# Patient Record
Sex: FEMALE | URBAN - NONMETROPOLITAN AREA
[De-identification: names, ages, dates, MRNs, and addresses within clinical notes are randomized per-mention and may not be internally consistent; named-entity substitution may affect disease eponyms.]

---

## 2021-12-18 ENCOUNTER — HOSPITAL ENCOUNTER (EMERGENCY)
Facility: HOSPITAL | Age: 26
Discharge: LEFT BEFORE DISCHARGE | End: 2021-12-18

## 2021-12-18 VITALS
OXYGEN SATURATION: 100 % | SYSTOLIC BLOOD PRESSURE: 140 MMHG | DIASTOLIC BLOOD PRESSURE: 102 MMHG | HEART RATE: 84 BPM | WEIGHT: 155 LBS | RESPIRATION RATE: 18 BRPM | BODY MASS INDEX: 27.46 KG/M2 | HEIGHT: 63 IN | TEMPERATURE: 98.6 F

## 2021-12-18 PROCEDURE — 99211 OFF/OP EST MAY X REQ PHY/QHP: CPT

## 2024-03-25 DIAGNOSIS — O99.340 ANXIETY DURING PREGNANCY: Primary | ICD-10-CM

## 2024-03-25 DIAGNOSIS — F41.9 ANXIETY DURING PREGNANCY: Primary | ICD-10-CM

## 2024-03-25 RX ORDER — BUSPIRONE HYDROCHLORIDE 5 MG/1
5 TABLET ORAL 2 TIMES DAILY PRN
Qty: 60 TABLET | Refills: 5 | Status: SHIPPED | OUTPATIENT
Start: 2024-03-25

## 2024-04-17 ENCOUNTER — INITIAL PRENATAL (OUTPATIENT)
Dept: OBSTETRICS AND GYNECOLOGY | Facility: CLINIC | Age: 29
End: 2024-04-17
Payer: COMMERCIAL

## 2024-04-17 VITALS — SYSTOLIC BLOOD PRESSURE: 132 MMHG | BODY MASS INDEX: 27.28 KG/M2 | DIASTOLIC BLOOD PRESSURE: 82 MMHG | WEIGHT: 154 LBS

## 2024-04-17 DIAGNOSIS — Z98.891 PREVIOUS CESAREAN SECTION: ICD-10-CM

## 2024-04-17 DIAGNOSIS — Z34.91 PRENATAL CARE IN FIRST TRIMESTER: Primary | ICD-10-CM

## 2024-04-17 DIAGNOSIS — F41.9 ANXIETY: ICD-10-CM

## 2024-04-17 DIAGNOSIS — O09.899 SHORT INTERVAL BETWEEN PREGNANCIES AFFECTING PREGNANCY, ANTEPARTUM: ICD-10-CM

## 2024-04-17 DIAGNOSIS — O36.80X0 ENCOUNTER TO DETERMINE FETAL VIABILITY OF PREGNANCY, SINGLE OR UNSPECIFIED FETUS: ICD-10-CM

## 2024-04-17 PROCEDURE — 0501F PRENATAL FLOW SHEET: CPT | Performed by: OBSTETRICS & GYNECOLOGY

## 2024-04-19 LAB
AMPHETAMINES UR QL SCN: NEGATIVE NG/ML
BARBITURATES UR QL SCN: NEGATIVE NG/ML
BENZODIAZ UR QL SCN: NEGATIVE NG/ML
BZE UR QL SCN: NEGATIVE NG/ML
CANNABINOIDS UR QL SCN: NEGATIVE NG/ML
CREAT UR-MCNC: 10.8 MG/DL (ref 20–300)
LABORATORY COMMENT REPORT: ABNORMAL
METHADONE UR QL SCN: NEGATIVE NG/ML
OPIATES UR QL SCN: NEGATIVE NG/ML
OXYCODONE+OXYMORPHONE UR QL SCN: NEGATIVE NG/ML
PCP UR QL: NEGATIVE NG/ML
PH UR: 6.5 [PH] (ref 4.5–8.9)
PROPOXYPH UR QL SCN: NEGATIVE NG/ML
SP GR UR: 1

## 2024-04-21 LAB
ABO GROUP BLD: NORMAL
ALBUMIN SERPL-MCNC: 4.7 G/DL (ref 4–5)
ALBUMIN/GLOB SERPL: 1.7 {RATIO} (ref 1.2–2.2)
ALP SERPL-CCNC: 60 IU/L (ref 44–121)
ALT SERPL-CCNC: 12 IU/L (ref 0–32)
AST SERPL-CCNC: 16 IU/L (ref 0–40)
BASOPHILS # BLD AUTO: 0 X10E3/UL (ref 0–0.2)
BASOPHILS NFR BLD AUTO: 0 %
BILIRUB SERPL-MCNC: 0.3 MG/DL (ref 0–1.2)
BLD GP AB SCN SERPL QL: NEGATIVE
BUN SERPL-MCNC: 11 MG/DL (ref 6–20)
BUN/CREAT SERPL: 13 (ref 9–23)
C TRACH RRNA SPEC QL NAA+PROBE: NEGATIVE
CALCIUM SERPL-MCNC: 9.6 MG/DL (ref 8.7–10.2)
CHLORIDE SERPL-SCNC: 102 MMOL/L (ref 96–106)
CO2 SERPL-SCNC: 21 MMOL/L (ref 20–29)
CREAT SERPL-MCNC: 0.83 MG/DL (ref 0.57–1)
CREAT UR-MCNC: 10.2 MG/DL
EGFRCR SERPLBLD CKD-EPI 2021: 100 ML/MIN/1.73
EOSINOPHIL # BLD AUTO: 0.1 X10E3/UL (ref 0–0.4)
EOSINOPHIL NFR BLD AUTO: 1 %
ERYTHROCYTE [DISTWIDTH] IN BLOOD BY AUTOMATED COUNT: 12.1 % (ref 11.7–15.4)
GLOBULIN SER CALC-MCNC: 2.7 G/DL (ref 1.5–4.5)
GLUCOSE SERPL-MCNC: 95 MG/DL (ref 70–99)
HBV SURFACE AG SERPL QL IA: NEGATIVE
HCT VFR BLD AUTO: 44.5 % (ref 34–46.6)
HCV IGG SERPL QL IA: NON REACTIVE
HGB BLD-MCNC: 14.9 G/DL (ref 11.1–15.9)
HIV 1+2 AB+HIV1 P24 AG SERPL QL IA: NON REACTIVE
IMM GRANULOCYTES # BLD AUTO: 0 X10E3/UL (ref 0–0.1)
IMM GRANULOCYTES NFR BLD AUTO: 0 %
LYMPHOCYTES # BLD AUTO: 1.9 X10E3/UL (ref 0.7–3.1)
LYMPHOCYTES NFR BLD AUTO: 20 %
MCH RBC QN AUTO: 28.7 PG (ref 26.6–33)
MCHC RBC AUTO-ENTMCNC: 33.5 G/DL (ref 31.5–35.7)
MCV RBC AUTO: 86 FL (ref 79–97)
MONOCYTES # BLD AUTO: 0.6 X10E3/UL (ref 0.1–0.9)
MONOCYTES NFR BLD AUTO: 6 %
N GONORRHOEA RRNA SPEC QL NAA+PROBE: NEGATIVE
NEUTROPHILS # BLD AUTO: 6.9 X10E3/UL (ref 1.4–7)
NEUTROPHILS NFR BLD AUTO: 73 %
PLATELET # BLD AUTO: 248 X10E3/UL (ref 150–450)
POTASSIUM SERPL-SCNC: 3.9 MMOL/L (ref 3.5–5.2)
PROT SERPL-MCNC: 7.4 G/DL (ref 6–8.5)
PROT UR-MCNC: <4 MG/DL
PROT/CREAT UR: ABNORMAL MG/G CREAT (ref 0–200)
RBC # BLD AUTO: 5.2 X10E6/UL (ref 3.77–5.28)
RH BLD: NEGATIVE
RPR SER QL: NON REACTIVE
RUBV IGG SERPL IA-ACNC: 2.7 INDEX
SODIUM SERPL-SCNC: 139 MMOL/L (ref 134–144)
WBC # BLD AUTO: 9.5 X10E3/UL (ref 3.4–10.8)

## 2024-04-29 PROBLEM — Z98.891 PREVIOUS CESAREAN SECTION: Status: ACTIVE | Noted: 2024-04-29

## 2024-04-29 PROBLEM — O09.899 SHORT INTERVAL BETWEEN PREGNANCIES AFFECTING PREGNANCY, ANTEPARTUM: Status: ACTIVE | Noted: 2024-04-29

## 2024-04-29 PROBLEM — F41.9 ANXIETY: Status: ACTIVE | Noted: 2024-04-29

## 2024-04-29 NOTE — PROGRESS NOTES
New Pregnancy Visit    Subjective   Chief Complaint   Patient presents with    Initial Prenatal Visit     LMP 24, TVS done today 8w4d. No complaints       Sandhya Marte is a 28 y.o. year old .  Patient's last menstrual period was 2024 (exact date).  She presents to initiate prenatal care with our group today.     Previous  in 2023 secondary to fetal heart rate.  She was induced for elevated blood pressure at 37 weeks.  No current blood pressure medication.  History of anxiety, stable on BuSpar 5 mg twice daily as needed.    Social History    Tobacco Use      Smoking status: Never      Smokeless tobacco: Never      Current Outpatient Medications on File Prior to Visit   Medication Sig Dispense Refill    busPIRone (BUSPAR) 5 MG tablet Take 1 tablet by mouth 2 (Two) Times a Day As Needed (anxiety). 60 tablet 5     No current facility-administered medications on file prior to visit.          Objective   /82   Wt 69.9 kg (154 lb)   LMP 2024 (Exact Date)   BMI 27.28 kg/m²   Physical Exam:  Normal, gestational age-appropriate exam today        Medical Decision Making:    Lab Review:   No data reviewed    Note Review:  None    Imaging Review:  Pelvic ultrasound report  IUP measuring 8+4 weeks with appropriate fetal cardiac activity. YASMIN is set at 2024 (sure LMP). There is a 1.8 cm subchorionic hematoma noted. The cervix and bilateral ovaries are normal in appearance. The right ovary does contain a 3.7 cm simple cyst. No free fluid in the cul-de-sac.   Assessment   IUP at 9+1 weeks  Supervision of high risk pregnancy  Previous C/S with planned repeat C/S  Short interval pregnancy  Subchorionic hematoma  History of gestational hypertension  History of anxiety, stable on medication     Plan    The problem list for pregnancy was initiated today  Tests/Orders/Rx for today:  Orders Placed This Encounter   Procedures    Chlamydia trachomatis, Neisseria gonorrhoeae, PCR w/  confirmation - Urine, Urine, Clean Catch     Order Specific Question:   Release to patient     Answer:   Routine Release [[id number]]    US Ob Transvaginal     Order Specific Question:   Reason for Exam:     Answer:   NOB, dates, viability     Order Specific Question:   Release to patient     Answer:   Routine Release [[id number]]    Urine Drug Screen - Urine, Clean Catch     Order Specific Question:   Release to patient     Answer:   Routine Release [[id number]]    OB Panel With HIV     Order Specific Question:   Release to patient     Answer:   Routine Release [[id number]]    Comprehensive Metabolic Panel     Order Specific Question:   Release to patient     Answer:   Routine Release [[id number]]    Protein / Creatinine Ratio, Urine - Urine, Clean Catch     Order Specific Question:   Release to patient     Answer:   Routine Release [[id number]]    Specific Gravity       Medication Management: None    Testing for GC / Chlamydia / trichomonas was done today  Genetic testing reviewed: she will consider the information and make a decision at a later date.  Information reviewed: exercise in pregnancy, nutrition in pregnancy, weight gain in pregnancy, work and travel restrictions during pregnancy, list of OTC medications acceptable in pregnancy, and call coverage groups    Follow up: 4 week(s)    Liu Paniagua MD  Obstetrics and Gynecology  [facility]

## 2024-05-16 ENCOUNTER — ROUTINE PRENATAL (OUTPATIENT)
Dept: OBSTETRICS AND GYNECOLOGY | Facility: CLINIC | Age: 29
End: 2024-05-16
Payer: COMMERCIAL

## 2024-05-16 VITALS — DIASTOLIC BLOOD PRESSURE: 62 MMHG | WEIGHT: 153 LBS | BODY MASS INDEX: 27.1 KG/M2 | SYSTOLIC BLOOD PRESSURE: 110 MMHG

## 2024-05-16 DIAGNOSIS — Z34.92 PRENATAL CARE IN SECOND TRIMESTER: Primary | ICD-10-CM

## 2024-05-16 DIAGNOSIS — O09.899 SHORT INTERVAL BETWEEN PREGNANCIES AFFECTING PREGNANCY, ANTEPARTUM: ICD-10-CM

## 2024-05-16 DIAGNOSIS — Z98.891 PREVIOUS CESAREAN SECTION: ICD-10-CM

## 2024-05-21 NOTE — PROGRESS NOTES
Prenatal Care Visit    Subjective   Chief Complaint   Patient presents with    Routine Prenatal Visit       History:   Sandhya is a  currently at 13w2d who presents for a prenatal care visit today.    No major issues.    Social History    Tobacco Use      Smoking status: Never      Smokeless tobacco: Never       Objective   /62   Wt 69.4 kg (153 lb)   LMP 2024 (Exact Date)   BMI 27.10 kg/m²   Physical Exam:  Normal, gestational age-appropriate exam today        Plan   Medical Decision Making:    I have reviewed the prenatal labs and ultrasound(s) today. I have reviewed the most recent prenatal progress note(s).    Diagnosis: Supervision of high risk pregnancy  Previous C/S with planned repeat C/S  Short interval pregnancy  Subchorionic hematoma  History of gestational hypertension  History of anxiety, stable on medication   Tests/Orders/Rx today: No orders of the defined types were placed in this encounter.      Medication Management: Start aspirin 81 mg daily     Topics discussed: Prenatal care milestones  ASA   Tests next visit: U/S for anatomic screening   Next visit: 5 week(s)     Liu Paniagua MD  Obstetrics and Gynecology  [facility]

## 2024-06-25 ENCOUNTER — ROUTINE PRENATAL (OUTPATIENT)
Dept: OBSTETRICS AND GYNECOLOGY | Facility: CLINIC | Age: 29
End: 2024-06-25
Payer: COMMERCIAL

## 2024-06-25 VITALS — DIASTOLIC BLOOD PRESSURE: 90 MMHG | SYSTOLIC BLOOD PRESSURE: 142 MMHG | BODY MASS INDEX: 27.1 KG/M2 | WEIGHT: 153 LBS

## 2024-06-25 DIAGNOSIS — O99.340 ANXIETY DURING PREGNANCY: ICD-10-CM

## 2024-06-25 DIAGNOSIS — R10.2 PELVIC PRESSURE IN FEMALE: ICD-10-CM

## 2024-06-25 DIAGNOSIS — F41.9 ANXIETY DURING PREGNANCY: ICD-10-CM

## 2024-06-25 DIAGNOSIS — Z34.92 PRENATAL CARE IN SECOND TRIMESTER: Primary | ICD-10-CM

## 2024-06-25 DIAGNOSIS — Z36.89 ENCOUNTER FOR FETAL ANATOMIC SURVEY: ICD-10-CM

## 2024-06-25 PROCEDURE — 0502F SUBSEQUENT PRENATAL CARE: CPT | Performed by: OBSTETRICS & GYNECOLOGY

## 2024-06-25 RX ORDER — BUSPIRONE HYDROCHLORIDE 5 MG/1
10 TABLET ORAL 2 TIMES DAILY PRN
Qty: 60 TABLET | Refills: 5 | Status: SHIPPED | OUTPATIENT
Start: 2024-06-25

## 2024-06-26 LAB
APPEARANCE UR: CLEAR
BACTERIA #/AREA URNS HPF: NORMAL /[HPF]
BILIRUB UR QL STRIP: NEGATIVE
CASTS URNS QL MICRO: NORMAL /LPF
COLOR UR: YELLOW
EPI CELLS #/AREA URNS HPF: NORMAL /HPF (ref 0–10)
GLUCOSE UR QL STRIP: NEGATIVE
HGB UR QL STRIP: NEGATIVE
KETONES UR QL STRIP: NEGATIVE
LEUKOCYTE ESTERASE UR QL STRIP: NEGATIVE
MICRO URNS: NORMAL
MICRO URNS: NORMAL
NITRITE UR QL STRIP: NEGATIVE
PH UR STRIP: 6.5 [PH] (ref 5–7.5)
PROT UR QL STRIP: NEGATIVE
RBC #/AREA URNS HPF: NORMAL /HPF (ref 0–2)
SP GR UR STRIP: 1.01 (ref 1–1.03)
URINALYSIS REFLEX: NORMAL
UROBILINOGEN UR STRIP-MCNC: 0.2 MG/DL (ref 0.2–1)
WBC #/AREA URNS HPF: NORMAL /HPF (ref 0–5)

## 2024-07-03 NOTE — PROGRESS NOTES
Prenatal Care Visit    Subjective   Chief Complaint   Patient presents with    Routine Prenatal Visit     Anatomy scan       History:   Sandhya is a  currently at 19w0d who presents for a prenatal care visit today.    No major issues.    Social History    Tobacco Use      Smoking status: Never      Smokeless tobacco: Never       Objective   /90   Wt 69.4 kg (153 lb)   LMP 2024 (Exact Date)   BMI 27.10 kg/m²   Physical Exam:  Normal, gestational age-appropriate exam today        Plan   Medical Decision Making:    I have reviewed the prenatal labs and ultrasound(s) today. I have reviewed the most recent prenatal progress note(s).    Diagnosis: Supervision of high risk pregnancy  Previous C/S with planned repeat C/S  Short interval pregnancy  Subchorionic hematoma  History of gestational hypertension  History of anxiety, stable on medication   Tests/Orders/Rx today: Orders Placed This Encounter   Procedures    US Ob 14 + Weeks Single or First Gestation     Order Specific Question:   Reason for Exam:     Answer:   anatomy scan     Order Specific Question:   Release to patient     Answer:   Routine Release [[id number]]    Urinalysis With Culture If Indicated - Urine, Clean Catch     Order Specific Question:   Release to patient     Answer:   Routine Release [[id number]]    Microscopic Examination -       Medication Management: Increase BuSpar dosing to 10 mg BID     Topics discussed: Prenatal care milestones  Kick counts  PTL precautions  Ultrasound findings reviewed  Urine testing today   Tests next visit: none   Next visit: 2 week(s)     Liu Paniagua MD  Obstetrics and Gynecology  [facility]

## 2024-08-01 ENCOUNTER — ROUTINE PRENATAL (OUTPATIENT)
Dept: OBSTETRICS AND GYNECOLOGY | Facility: CLINIC | Age: 29
End: 2024-08-01
Payer: COMMERCIAL

## 2024-08-01 VITALS — WEIGHT: 159 LBS | SYSTOLIC BLOOD PRESSURE: 112 MMHG | BODY MASS INDEX: 28.17 KG/M2 | DIASTOLIC BLOOD PRESSURE: 70 MMHG

## 2024-08-01 DIAGNOSIS — O09.899 SHORT INTERVAL BETWEEN PREGNANCIES AFFECTING PREGNANCY, ANTEPARTUM: ICD-10-CM

## 2024-08-01 DIAGNOSIS — Z98.891 PREVIOUS CESAREAN SECTION: ICD-10-CM

## 2024-08-01 DIAGNOSIS — Z34.92 PRENATAL CARE IN SECOND TRIMESTER: Primary | ICD-10-CM

## 2024-08-01 RX ORDER — PRENATAL VIT NO.126/IRON/FOLIC 28MG-0.8MG
TABLET ORAL DAILY
COMMUNITY

## 2024-08-01 NOTE — PROGRESS NOTES
Prenatal Care Visit    Subjective   Chief Complaint   Patient presents with    Routine Prenatal Visit     No complaints       History:   Sandhya is a  currently at 24w2d who presents for a prenatal care visit today.    No major issues.    Social History    Tobacco Use      Smoking status: Never      Smokeless tobacco: Never       Objective   /70   Wt 72.1 kg (159 lb)   LMP 2024 (Exact Date)   BMI 28.17 kg/m²   Physical Exam:  Normal, gestational age-appropriate exam today        Plan   Medical Decision Making:    I have reviewed the prenatal labs and ultrasound(s) today. I have reviewed the most recent prenatal progress note(s).    Diagnosis: Supervision of high risk pregnancy  Previous C/S with planned repeat C/S  Short interval pregnancy  Subchorionic hematoma  History of gestational hypertension  History of anxiety, stable on medication   Tests/Orders/Rx today: No orders of the defined types were placed in this encounter.      Medication Management: None     Topics discussed: Prenatal care milestones  Kick counts  PTL precautions  Borderline Bps  Mood stable - BuSpar 10 mg BID prn   Tests next visit: GCT  HgB   Next visit: 2 week(s)     Liu Paniagua MD  Obstetrics and Gynecology  [facility]

## 2024-08-13 ENCOUNTER — ROUTINE PRENATAL (OUTPATIENT)
Dept: OBSTETRICS AND GYNECOLOGY | Facility: CLINIC | Age: 29
End: 2024-08-13
Payer: COMMERCIAL

## 2024-08-13 VITALS — BODY MASS INDEX: 28.34 KG/M2 | DIASTOLIC BLOOD PRESSURE: 78 MMHG | WEIGHT: 160 LBS | SYSTOLIC BLOOD PRESSURE: 112 MMHG

## 2024-08-13 DIAGNOSIS — F41.9 ANXIETY: ICD-10-CM

## 2024-08-13 DIAGNOSIS — O09.899 SHORT INTERVAL BETWEEN PREGNANCIES AFFECTING PREGNANCY, ANTEPARTUM: ICD-10-CM

## 2024-08-13 DIAGNOSIS — Z34.92 PRENATAL CARE IN SECOND TRIMESTER: Primary | ICD-10-CM

## 2024-08-13 DIAGNOSIS — Z98.891 PREVIOUS CESAREAN SECTION: ICD-10-CM

## 2024-08-13 DIAGNOSIS — Z13.1 DIABETES MELLITUS SCREENING: ICD-10-CM

## 2024-08-13 LAB
BASOPHILS # BLD AUTO: 0.01 10*3/MM3 (ref 0–0.2)
BASOPHILS NFR BLD AUTO: 0.1 % (ref 0–1.5)
EOSINOPHIL # BLD AUTO: 0.03 10*3/MM3 (ref 0–0.4)
EOSINOPHIL NFR BLD AUTO: 0.3 % (ref 0.3–6.2)
ERYTHROCYTE [DISTWIDTH] IN BLOOD BY AUTOMATED COUNT: 12.6 % (ref 12.3–15.4)
GLUCOSE 1H P 50 G GLC PO SERPL-MCNC: 85 MG/DL (ref 65–139)
HCT VFR BLD AUTO: 39 % (ref 34–46.6)
HGB BLD-MCNC: 13 G/DL (ref 12–15.9)
IMM GRANULOCYTES # BLD AUTO: 0.06 10*3/MM3 (ref 0–0.05)
IMM GRANULOCYTES NFR BLD AUTO: 0.7 % (ref 0–0.5)
LYMPHOCYTES # BLD AUTO: 1.5 10*3/MM3 (ref 0.7–3.1)
LYMPHOCYTES NFR BLD AUTO: 17.1 % (ref 19.6–45.3)
MCH RBC QN AUTO: 30.2 PG (ref 26.6–33)
MCHC RBC AUTO-ENTMCNC: 33.3 G/DL (ref 31.5–35.7)
MCV RBC AUTO: 90.7 FL (ref 79–97)
MONOCYTES # BLD AUTO: 0.44 10*3/MM3 (ref 0.1–0.9)
MONOCYTES NFR BLD AUTO: 5 % (ref 5–12)
NEUTROPHILS # BLD AUTO: 6.73 10*3/MM3 (ref 1.7–7)
NEUTROPHILS NFR BLD AUTO: 76.8 % (ref 42.7–76)
NRBC BLD AUTO-RTO: 0 /100 WBC (ref 0–0.2)
PLATELET # BLD AUTO: 211 10*3/MM3 (ref 140–450)
RBC # BLD AUTO: 4.3 10*6/MM3 (ref 3.77–5.28)
WBC # BLD AUTO: 8.77 10*3/MM3 (ref 3.4–10.8)

## 2024-08-13 PROCEDURE — 0502F SUBSEQUENT PRENATAL CARE: CPT | Performed by: OBSTETRICS & GYNECOLOGY

## 2024-08-13 NOTE — PROGRESS NOTES
Prenatal Care Visit    Subjective   Chief Complaint   Patient presents with    Routine Prenatal Visit     Glucola, burning sensation on hips and lower back.       History:   Sandhya is a  currently at 26w0d who presents for a prenatal care visit today.    No major issues.    Social History    Tobacco Use      Smoking status: Never      Smokeless tobacco: Never       Objective   /78   Wt 72.6 kg (160 lb)   LMP 2024 (Exact Date)   BMI 28.34 kg/m²   Physical Exam:  Normal, gestational age-appropriate exam today        Plan   Medical Decision Making:    I have reviewed the prenatal labs and ultrasound(s) today. I have reviewed the most recent prenatal progress note(s).    Diagnosis: Supervision of high risk pregnancy  Previous C/S with planned repeat C/S  Short interval pregnancy  Subchorionic hematoma, resolved  History of gestational hypertension  History of anxiety, stable on medication   Tests/Orders/Rx today: Orders Placed This Encounter   Procedures    Gestational Screen 1 Hr ([facility])     Order Specific Question:   Release to patient     Answer:   Routine Release [[id number]]    CBC & Differential     Order Specific Question:   Manual Differential     Answer:   No     Order Specific Question:   Release to patient     Answer:   Routine Release [[id number]]       Medication Management: None     Topics discussed: Prenatal care milestones  Kick counts + anterior placenta  PTL precautions  PIH precautions  BP improved  Mood stable - BuSpar 10 mg BID prn   Tests next visit: rhogam   Next visit: 4 week(s)     Liu Paniagua MD  Obstetrics and Gynecology  [facility]

## 2024-09-10 ENCOUNTER — ROUTINE PRENATAL (OUTPATIENT)
Dept: OBSTETRICS AND GYNECOLOGY | Facility: CLINIC | Age: 29
End: 2024-09-10
Payer: COMMERCIAL

## 2024-09-10 VITALS — SYSTOLIC BLOOD PRESSURE: 120 MMHG | DIASTOLIC BLOOD PRESSURE: 76 MMHG | WEIGHT: 163.4 LBS | BODY MASS INDEX: 28.95 KG/M2

## 2024-09-10 DIAGNOSIS — Z98.891 PREVIOUS CESAREAN SECTION: ICD-10-CM

## 2024-09-10 DIAGNOSIS — O09.899 SHORT INTERVAL BETWEEN PREGNANCIES AFFECTING PREGNANCY, ANTEPARTUM: ICD-10-CM

## 2024-09-10 DIAGNOSIS — Z34.93 PRENATAL CARE IN THIRD TRIMESTER: Primary | ICD-10-CM

## 2024-09-10 DIAGNOSIS — Z67.91 RH NEGATIVE STATUS DURING PREGNANCY IN SECOND TRIMESTER: ICD-10-CM

## 2024-09-10 DIAGNOSIS — O26.892 RH NEGATIVE STATUS DURING PREGNANCY IN SECOND TRIMESTER: ICD-10-CM

## 2024-09-15 NOTE — PROGRESS NOTES
Prenatal Care Visit    Subjective   Chief Complaint   Patient presents with    Routine Prenatal Visit     Prenatal visit with Rhogam given today. No problems or concerns       History:   Sandhya is a  currently at 30w0d who presents for a prenatal care visit today.    No major issues.    Social History    Tobacco Use      Smoking status: Never      Smokeless tobacco: Never       Objective   /76   Wt 74.1 kg (163 lb 6.4 oz)   LMP 2024 (Exact Date)   BMI 28.95 kg/m²   Physical Exam:  Normal, gestational age-appropriate exam today        Plan   Medical Decision Making:    I have reviewed the prenatal labs and ultrasound(s) today. I have reviewed the most recent prenatal progress note(s).    Diagnosis: Supervision of high risk pregnancy  Previous C/S with planned repeat C/S  Short interval pregnancy  Subchorionic hematoma, resolved  History of gestational hypertension  History of anxiety, stable on medication   Tests/Orders/Rx today: Orders Placed This Encounter   Procedures    Rhogam Immune Globulin Immunization       Medication Management: None     Topics discussed: Prenatal care milestones  Kick counts + anterior placenta  PTL precautions  PIH precautions  Mood stable - BuSpar 10 mg BID prn   Tests next visit: U/S for EFW   Next visit: 2 week(s)     Liu Paniagua MD  Obstetrics and Gynecology  [facility]

## 2024-09-25 ENCOUNTER — ROUTINE PRENATAL (OUTPATIENT)
Dept: OBSTETRICS AND GYNECOLOGY | Facility: CLINIC | Age: 29
End: 2024-09-25
Payer: COMMERCIAL

## 2024-09-25 VITALS — SYSTOLIC BLOOD PRESSURE: 110 MMHG | DIASTOLIC BLOOD PRESSURE: 76 MMHG | WEIGHT: 163 LBS | BODY MASS INDEX: 28.87 KG/M2

## 2024-09-25 DIAGNOSIS — F41.9 ANXIETY DURING PREGNANCY: ICD-10-CM

## 2024-09-25 DIAGNOSIS — Z36.89 ENCOUNTER FOR ULTRASOUND TO ASSESS FETAL GROWTH: ICD-10-CM

## 2024-09-25 DIAGNOSIS — O09.899 SHORT INTERVAL BETWEEN PREGNANCIES AFFECTING PREGNANCY, ANTEPARTUM: ICD-10-CM

## 2024-09-25 DIAGNOSIS — Z67.91 RH NEGATIVE STATUS DURING PREGNANCY IN SECOND TRIMESTER: ICD-10-CM

## 2024-09-25 DIAGNOSIS — Z34.93 PRENATAL CARE IN THIRD TRIMESTER: Primary | ICD-10-CM

## 2024-09-25 DIAGNOSIS — O99.340 ANXIETY DURING PREGNANCY: ICD-10-CM

## 2024-09-25 DIAGNOSIS — O26.892 RH NEGATIVE STATUS DURING PREGNANCY IN SECOND TRIMESTER: ICD-10-CM

## 2024-09-25 DIAGNOSIS — Z98.891 PREVIOUS CESAREAN SECTION: ICD-10-CM

## 2024-09-25 RX ORDER — BUSPIRONE HYDROCHLORIDE 5 MG/1
10 TABLET ORAL 2 TIMES DAILY
Qty: 60 TABLET | Refills: 5 | Status: SHIPPED | OUTPATIENT
Start: 2024-09-25

## 2024-09-27 PROBLEM — O26.892 RH NEGATIVE STATUS DURING PREGNANCY IN SECOND TRIMESTER: Status: ACTIVE | Noted: 2024-09-27

## 2024-09-27 PROBLEM — Z67.91 RH NEGATIVE STATUS DURING PREGNANCY IN SECOND TRIMESTER: Status: ACTIVE | Noted: 2024-09-27

## 2024-09-27 PROBLEM — O99.340 ANXIETY DURING PREGNANCY: Status: ACTIVE | Noted: 2024-09-27

## 2024-09-27 PROBLEM — F41.9 ANXIETY DURING PREGNANCY: Status: ACTIVE | Noted: 2024-09-27

## 2024-10-08 ENCOUNTER — PREP FOR SURGERY (OUTPATIENT)
Dept: OTHER | Facility: HOSPITAL | Age: 29
End: 2024-10-08
Payer: COMMERCIAL

## 2024-10-08 ENCOUNTER — ROUTINE PRENATAL (OUTPATIENT)
Dept: OBSTETRICS AND GYNECOLOGY | Facility: CLINIC | Age: 29
End: 2024-10-08
Payer: COMMERCIAL

## 2024-10-08 VITALS — SYSTOLIC BLOOD PRESSURE: 112 MMHG | BODY MASS INDEX: 29.58 KG/M2 | DIASTOLIC BLOOD PRESSURE: 74 MMHG | WEIGHT: 167 LBS

## 2024-10-08 DIAGNOSIS — Z98.891 PREVIOUS CESAREAN SECTION: ICD-10-CM

## 2024-10-08 DIAGNOSIS — O10.919 HTN IN PREGNANCY, CHRONIC: Primary | ICD-10-CM

## 2024-10-08 DIAGNOSIS — Z34.93 PRENATAL CARE IN THIRD TRIMESTER: Primary | ICD-10-CM

## 2024-10-08 DIAGNOSIS — O10.919 HTN IN PREGNANCY, CHRONIC: ICD-10-CM

## 2024-10-08 DIAGNOSIS — O26.892 RH NEGATIVE STATUS DURING PREGNANCY IN SECOND TRIMESTER: ICD-10-CM

## 2024-10-08 DIAGNOSIS — O09.899 SHORT INTERVAL BETWEEN PREGNANCIES AFFECTING PREGNANCY, ANTEPARTUM: ICD-10-CM

## 2024-10-08 DIAGNOSIS — Z67.91 RH NEGATIVE STATUS DURING PREGNANCY IN SECOND TRIMESTER: ICD-10-CM

## 2024-10-08 PROCEDURE — 0502F SUBSEQUENT PRENATAL CARE: CPT | Performed by: OBSTETRICS & GYNECOLOGY

## 2024-10-08 RX ORDER — SODIUM CHLORIDE 0.9 % (FLUSH) 0.9 %
10 SYRINGE (ML) INJECTION AS NEEDED
OUTPATIENT
Start: 2024-10-08

## 2024-10-08 RX ORDER — OXYTOCIN/0.9 % SODIUM CHLORIDE 30/500 ML
333 PLASTIC BAG, INJECTION (ML) INTRAVENOUS ONCE
OUTPATIENT
Start: 2024-10-08 | End: 2024-10-08

## 2024-10-08 RX ORDER — CITRIC ACID/SODIUM CITRATE 334-500MG
30 SOLUTION, ORAL ORAL ONCE
OUTPATIENT
Start: 2024-10-08 | End: 2024-10-08

## 2024-10-08 RX ORDER — METHYLERGONOVINE MALEATE 0.2 MG/ML
200 INJECTION INTRAVENOUS ONCE AS NEEDED
OUTPATIENT
Start: 2024-10-08

## 2024-10-08 RX ORDER — ACETAMINOPHEN 500 MG
1000 TABLET ORAL ONCE
OUTPATIENT
Start: 2024-10-08 | End: 2024-10-08

## 2024-10-08 RX ORDER — CARBOPROST TROMETHAMINE 250 UG/ML
250 INJECTION, SOLUTION INTRAMUSCULAR AS NEEDED
OUTPATIENT
Start: 2024-10-08

## 2024-10-08 RX ORDER — LIDOCAINE HYDROCHLORIDE 10 MG/ML
0.5 INJECTION, SOLUTION INFILTRATION; PERINEURAL ONCE AS NEEDED
OUTPATIENT
Start: 2024-10-08

## 2024-10-08 RX ORDER — FAMOTIDINE 10 MG/ML
20 INJECTION, SOLUTION INTRAVENOUS ONCE
OUTPATIENT
Start: 2024-10-08 | End: 2024-10-08

## 2024-10-08 RX ORDER — KETOROLAC TROMETHAMINE 30 MG/ML
30 INJECTION, SOLUTION INTRAMUSCULAR; INTRAVENOUS ONCE
OUTPATIENT
Start: 2024-10-08 | End: 2024-10-08

## 2024-10-08 RX ORDER — OXYTOCIN/0.9 % SODIUM CHLORIDE 30/500 ML
42 PLASTIC BAG, INJECTION (ML) INTRAVENOUS AS NEEDED
OUTPATIENT
Start: 2024-10-08 | End: 2024-10-09

## 2024-10-08 RX ORDER — SODIUM CHLORIDE 0.9 % (FLUSH) 0.9 %
10 SYRINGE (ML) INJECTION EVERY 12 HOURS SCHEDULED
OUTPATIENT
Start: 2024-10-08

## 2024-10-08 RX ORDER — MISOPROSTOL 200 UG/1
800 TABLET ORAL AS NEEDED
OUTPATIENT
Start: 2024-10-08

## 2024-10-08 NOTE — PROGRESS NOTES
Prenatal Care Visit    Subjective   Chief Complaint   Patient presents with    Routine Prenatal Visit     No Complaints/concerns        History:   Sandhya is a  currently at 34w1d who presents for a prenatal care visit today.    No major issues.    Social History    Tobacco Use      Smoking status: Never      Smokeless tobacco: Never       Objective   /74   Wt 75.8 kg (167 lb)   LMP 2024 (Exact Date)   BMI 29.58 kg/m²   Physical Exam:  Normal, gestational age-appropriate exam today        Plan   Medical Decision Making:    I have reviewed the prenatal labs and ultrasound(s) today. I have reviewed the most recent prenatal progress note(s).    Diagnosis: Supervision of high risk pregnancy  Previous C/S with planned repeat C/S  Short interval pregnancy  Chronic hypertension, no medication  Subchorionic hematoma, resolved  History of anxiety, on medication  Rh NEG   Tests/Orders/Rx today: No orders of the defined types were placed in this encounter.      Medication Management: None     Topics discussed: Prenatal care milestones  Kick counts   PTL precautions  PIH precautions  U/S findings  Mood stable  rLTCS planned for 38 weeks   Tests next visit: GBS screening   Next visit: 2 week(s)     Liu Paniagua MD  Obstetrics and Gynecology  [facility]

## 2024-10-21 ENCOUNTER — ROUTINE PRENATAL (OUTPATIENT)
Dept: OBSTETRICS AND GYNECOLOGY | Facility: CLINIC | Age: 29
End: 2024-10-21
Payer: COMMERCIAL

## 2024-10-21 VITALS — SYSTOLIC BLOOD PRESSURE: 120 MMHG | WEIGHT: 166.8 LBS | BODY MASS INDEX: 29.55 KG/M2 | DIASTOLIC BLOOD PRESSURE: 70 MMHG

## 2024-10-21 DIAGNOSIS — R07.89 CHEST PRESSURE: ICD-10-CM

## 2024-10-21 DIAGNOSIS — Z34.93 PRENATAL CARE IN THIRD TRIMESTER: Primary | ICD-10-CM

## 2024-10-21 DIAGNOSIS — O10.919 CHRONIC HYPERTENSION IN PREGNANCY: ICD-10-CM

## 2024-10-21 DIAGNOSIS — O10.919 HTN IN PREGNANCY, CHRONIC: ICD-10-CM

## 2024-10-21 DIAGNOSIS — O09.899 SHORT INTERVAL BETWEEN PREGNANCIES AFFECTING PREGNANCY, ANTEPARTUM: ICD-10-CM

## 2024-10-21 DIAGNOSIS — Z98.891 PREVIOUS CESAREAN SECTION: ICD-10-CM

## 2024-10-21 PROCEDURE — 0502F SUBSEQUENT PRENATAL CARE: CPT | Performed by: OBSTETRICS & GYNECOLOGY

## 2024-10-21 NOTE — PROGRESS NOTES
Prenatal Care Visit    Subjective   Chief Complaint   Patient presents with    Pregnancy Ultrasound     Position scan done today. Patient complains of pressure in her chest. GBS done today.       History:   Sandhya is a  currently at 35w6d who presents for a prenatal care visit today.    Worsening chest pressure this week.  Elevated BNP but normal echo following her last delivery.  She was on diuretics.    Social History    Tobacco Use      Smoking status: Never      Smokeless tobacco: Never       Objective   /70   Wt 75.7 kg (166 lb 12.8 oz)   LMP 2024 (Exact Date)   BMI 29.55 kg/m²   Physical Exam:  Normal, gestational age-appropriate exam today        Plan   Medical Decision Making:    I have reviewed the prenatal labs and ultrasound(s) today. I have reviewed the most recent prenatal progress note(s).    Diagnosis: Supervision of high risk pregnancy  Chest pressure  Previous C/S with planned repeat C/S  Transverse presentation, back down  Short interval pregnancy  Chronic hypertension, no medication  Subchorionic hematoma, resolved  History of anxiety, on medication  Rh NEG   Tests/Orders/Rx today: Orders Placed This Encounter   Procedures    Group B Streptococcus Culture - Swab, Vaginal/Rectum     Order Specific Question:   Release to patient     Answer:   Routine Release [[id number]]    Adult Transthoracic Echo Limited W/ Cont if Necessary Per Protocol     Standing Status:   Future     Standing Expiration Date:   10/21/2025     Order Specific Question:   Reason for exam?     Answer:   Dyspnea     Order Specific Question:   Reason for exam?     Answer:   Chest Pain     Order Specific Question:   Release to patient     Answer:   Routine Release [[id number]]       Medication Management: None     Topics discussed: Prenatal care milestones  Kick counts   PTL precautions  PIH precautions  U/S findings  Mood stable  rLTCS planned for 38 weeks  Presentation + uterine incisions  Echo for chest  pressure   Tests next visit: Ultrasound for presentation   Next visit: 1 week(s)     Liu Paniagua MD  Obstetrics and Gynecology  [facility]

## 2024-10-24 ENCOUNTER — LAB (OUTPATIENT)
Dept: LAB | Facility: HOSPITAL | Age: 29
End: 2024-10-24
Payer: COMMERCIAL

## 2024-10-24 DIAGNOSIS — O10.919 HTN IN PREGNANCY, CHRONIC: ICD-10-CM

## 2024-10-24 DIAGNOSIS — Z98.891 PREVIOUS CESAREAN SECTION: ICD-10-CM

## 2024-10-24 DIAGNOSIS — R79.89 ELEVATED BRAIN NATRIURETIC PEPTIDE (BNP) LEVEL: Primary | ICD-10-CM

## 2024-10-24 DIAGNOSIS — R79.89 ELEVATED BRAIN NATRIURETIC PEPTIDE (BNP) LEVEL: ICD-10-CM

## 2024-10-24 LAB
ALBUMIN SERPL-MCNC: 3.2 G/DL (ref 3.5–5.2)
ALBUMIN/GLOB SERPL: 1 G/DL
ALP SERPL-CCNC: 124 U/L (ref 39–117)
ALT SERPL W P-5'-P-CCNC: 12 U/L (ref 1–33)
ANION GAP SERPL CALCULATED.3IONS-SCNC: 10.3 MMOL/L (ref 5–15)
AST SERPL-CCNC: 19 U/L (ref 1–32)
BASOPHILS # BLD AUTO: 0.02 10*3/MM3 (ref 0–0.2)
BASOPHILS NFR BLD AUTO: 0.2 % (ref 0–1.5)
BILIRUB SERPL-MCNC: 0.3 MG/DL (ref 0–1.2)
BUN SERPL-MCNC: 9 MG/DL (ref 6–20)
BUN/CREAT SERPL: 12.5 (ref 7–25)
CALCIUM SPEC-SCNC: 9.1 MG/DL (ref 8.6–10.5)
CHLORIDE SERPL-SCNC: 104 MMOL/L (ref 98–107)
CO2 SERPL-SCNC: 21.7 MMOL/L (ref 22–29)
CREAT SERPL-MCNC: 0.72 MG/DL (ref 0.57–1)
DEPRECATED RDW RBC AUTO: 38.4 FL (ref 37–54)
EGFRCR SERPLBLD CKD-EPI 2021: 117.7 ML/MIN/1.73
EOSINOPHIL # BLD AUTO: 0.04 10*3/MM3 (ref 0–0.4)
EOSINOPHIL NFR BLD AUTO: 0.5 % (ref 0.3–6.2)
ERYTHROCYTE [DISTWIDTH] IN BLOOD BY AUTOMATED COUNT: 11.8 % (ref 12.3–15.4)
GLOBULIN UR ELPH-MCNC: 3.1 GM/DL
GLUCOSE SERPL-MCNC: 90 MG/DL (ref 65–99)
HCT VFR BLD AUTO: 40.2 % (ref 34–46.6)
HGB BLD-MCNC: 14.1 G/DL (ref 12–15.9)
IMM GRANULOCYTES # BLD AUTO: 0.04 10*3/MM3 (ref 0–0.05)
IMM GRANULOCYTES NFR BLD AUTO: 0.5 % (ref 0–0.5)
LYMPHOCYTES # BLD AUTO: 1.59 10*3/MM3 (ref 0.7–3.1)
LYMPHOCYTES NFR BLD AUTO: 18.4 % (ref 19.6–45.3)
MCH RBC QN AUTO: 31.3 PG (ref 26.6–33)
MCHC RBC AUTO-ENTMCNC: 35.1 G/DL (ref 31.5–35.7)
MCV RBC AUTO: 89.1 FL (ref 79–97)
MONOCYTES # BLD AUTO: 0.67 10*3/MM3 (ref 0.1–0.9)
MONOCYTES NFR BLD AUTO: 7.8 % (ref 5–12)
NEUTROPHILS NFR BLD AUTO: 6.28 10*3/MM3 (ref 1.7–7)
NEUTROPHILS NFR BLD AUTO: 72.6 % (ref 42.7–76)
NRBC BLD AUTO-RTO: 0 /100 WBC (ref 0–0.2)
NT-PROBNP SERPL-MCNC: <36 PG/ML (ref 0–450)
PLATELET # BLD AUTO: 167 10*3/MM3 (ref 140–450)
PMV BLD AUTO: 11.9 FL (ref 6–12)
POTASSIUM SERPL-SCNC: 4.1 MMOL/L (ref 3.5–5.2)
PROT SERPL-MCNC: 6.3 G/DL (ref 6–8.5)
RBC # BLD AUTO: 4.51 10*6/MM3 (ref 3.77–5.28)
SODIUM SERPL-SCNC: 136 MMOL/L (ref 136–145)
WBC NRBC COR # BLD AUTO: 8.64 10*3/MM3 (ref 3.4–10.8)

## 2024-10-24 PROCEDURE — 85025 COMPLETE CBC W/AUTO DIFF WBC: CPT

## 2024-10-24 PROCEDURE — 36415 COLL VENOUS BLD VENIPUNCTURE: CPT

## 2024-10-24 PROCEDURE — 83880 ASSAY OF NATRIURETIC PEPTIDE: CPT

## 2024-10-24 PROCEDURE — 80053 COMPREHEN METABOLIC PANEL: CPT

## 2024-10-25 LAB — B-HEM STREP SPEC QL CULT: NEGATIVE

## 2024-10-29 ENCOUNTER — ROUTINE PRENATAL (OUTPATIENT)
Dept: OBSTETRICS AND GYNECOLOGY | Facility: CLINIC | Age: 29
End: 2024-10-29
Payer: COMMERCIAL

## 2024-10-29 VITALS — BODY MASS INDEX: 29.23 KG/M2 | DIASTOLIC BLOOD PRESSURE: 80 MMHG | WEIGHT: 165 LBS | SYSTOLIC BLOOD PRESSURE: 122 MMHG

## 2024-10-29 DIAGNOSIS — O10.919 HTN IN PREGNANCY, CHRONIC: ICD-10-CM

## 2024-10-29 DIAGNOSIS — O32.2XX0 TRANSVERSE LIE OF FETUS, SINGLE OR UNSPECIFIED FETUS: ICD-10-CM

## 2024-10-29 DIAGNOSIS — O26.892 RH NEGATIVE STATUS DURING PREGNANCY IN SECOND TRIMESTER: ICD-10-CM

## 2024-10-29 DIAGNOSIS — Z67.91 RH NEGATIVE STATUS DURING PREGNANCY IN SECOND TRIMESTER: ICD-10-CM

## 2024-10-29 DIAGNOSIS — O09.899 SHORT INTERVAL BETWEEN PREGNANCIES AFFECTING PREGNANCY, ANTEPARTUM: ICD-10-CM

## 2024-10-29 DIAGNOSIS — Z34.93 PRENATAL CARE IN THIRD TRIMESTER: Primary | ICD-10-CM

## 2024-10-29 DIAGNOSIS — Z98.891 PREVIOUS CESAREAN SECTION: ICD-10-CM

## 2024-10-29 NOTE — PROGRESS NOTES
Prenatal Care Visit    Subjective   Chief Complaint   Patient presents with    Routine Prenatal Visit     No Complaints/concerns        History:   Sandhya is a  currently at 37w0d who presents for a prenatal care visit today.    No major issues.    Social History    Tobacco Use      Smoking status: Never      Smokeless tobacco: Never       Objective   /80   Wt 74.8 kg (165 lb)   LMP 2024 (Exact Date)   BMI 29.23 kg/m²   Physical Exam:  Normal, gestational age-appropriate exam today        Plan   Medical Decision Making:    I have reviewed the prenatal labs and ultrasound(s) today. I have reviewed the most recent prenatal progress note(s).    Diagnosis: Supervision of high risk pregnancy  Chest pressure, improved  Previous C/S with planned repeat C/S  Transverse presentation, back down  Short interval pregnancy  Chronic hypertension, no medication  Subchorionic hematoma, resolved  History of anxiety, on medication  Rh NEG   Tests/Orders/Rx today: Orders Placed This Encounter   Procedures    US Ob Limited 1 + Fetuses     Order Specific Question:   Reason for Exam:     Answer:   position     Order Specific Question:   Release to patient     Answer:   Routine Release [[id number]]       Medication Management: None     Topics discussed: Prenatal care milestones  Kick counts   PTL precautions  PIH precautions  U/S findings  Mood stable  rLTCS planned for 38 weeks  Presentation + uterine incisions   Tests next visit: Ultrasound for presentation   Next visit: 1 week(s)     Liu Paniagua MD  Obstetrics and Gynecology  [facility]

## 2024-11-05 ENCOUNTER — ROUTINE PRENATAL (OUTPATIENT)
Dept: OBSTETRICS AND GYNECOLOGY | Facility: CLINIC | Age: 29
End: 2024-11-05
Payer: COMMERCIAL

## 2024-11-05 VITALS — DIASTOLIC BLOOD PRESSURE: 74 MMHG | BODY MASS INDEX: 29.58 KG/M2 | WEIGHT: 167 LBS | SYSTOLIC BLOOD PRESSURE: 110 MMHG

## 2024-11-05 DIAGNOSIS — O10.919 HTN IN PREGNANCY, CHRONIC: ICD-10-CM

## 2024-11-05 DIAGNOSIS — Z98.891 PREVIOUS CESAREAN SECTION: ICD-10-CM

## 2024-11-05 DIAGNOSIS — Z34.93 PRENATAL CARE IN THIRD TRIMESTER: Primary | ICD-10-CM

## 2024-11-05 DIAGNOSIS — O09.899 SHORT INTERVAL BETWEEN PREGNANCIES AFFECTING PREGNANCY, ANTEPARTUM: ICD-10-CM

## 2024-11-05 NOTE — PROGRESS NOTES
Prenatal Care Visit    Subjective   Chief Complaint   Patient presents with    Routine Prenatal Visit     No Complaints/concerns        History:   Sandhya is a  currently at 38w0d who presents for a prenatal care visit today.    No major issues.    Social History    Tobacco Use      Smoking status: Never      Smokeless tobacco: Never       Objective   /74   Wt 75.8 kg (167 lb)   LMP 2024 (Exact Date)   BMI 29.58 kg/m²   Physical Exam:  Normal, gestational age-appropriate exam today        Plan   Medical Decision Making:    I have reviewed the prenatal labs and ultrasound(s) today. I have reviewed the most recent prenatal progress note(s).    Diagnosis: Supervision of high risk pregnancy  Chest pressure, improved  Previous C/S with planned repeat C/S  Transverse presentation, back down  Short interval pregnancy  Chronic hypertension, no medication  Subchorionic hematoma, resolved  History of anxiety, on medication  Rh NEG   Tests/Orders/Rx today: No orders of the defined types were placed in this encounter.      Medication Management: None     Topics discussed: Prenatal care milestones  Kick counts   Labor precautions  PIH precautions  U/S findings  Mood stable  rLTCS planned for 38 weeks  Presentation + uterine incisions   Tests next visit: none   Next visit: none     Liu Paniagua MD  Obstetrics and Gynecology  [facility]

## 2024-11-07 ENCOUNTER — ANESTHESIA EVENT (OUTPATIENT)
Dept: PERIOP | Facility: HOSPITAL | Age: 29
End: 2024-11-07
Payer: COMMERCIAL

## 2024-11-08 ENCOUNTER — HOSPITAL ENCOUNTER (INPATIENT)
Facility: HOSPITAL | Age: 29
LOS: 1 days | Discharge: HOME | End: 2024-11-09
Attending: OBSTETRICS & GYNECOLOGY | Admitting: OBSTETRICS & GYNECOLOGY
Payer: COMMERCIAL

## 2024-11-08 ENCOUNTER — ANESTHESIA (OUTPATIENT)
Dept: PERIOP | Facility: HOSPITAL | Age: 29
End: 2024-11-08
Payer: COMMERCIAL

## 2024-11-08 DIAGNOSIS — O10.919 HTN IN PREGNANCY, CHRONIC: ICD-10-CM

## 2024-11-08 DIAGNOSIS — O99.340 ANXIETY DURING PREGNANCY: ICD-10-CM

## 2024-11-08 DIAGNOSIS — F41.9 ANXIETY DURING PREGNANCY: ICD-10-CM

## 2024-11-08 DIAGNOSIS — Z98.891 PREVIOUS CESAREAN SECTION: ICD-10-CM

## 2024-11-08 LAB
ABO GROUP BLD: NORMAL
ABO GROUP BLD: NORMAL
ANTI-D: NORMAL
BASOPHILS # BLD AUTO: 0.01 10*3/MM3 (ref 0–0.2)
BASOPHILS NFR BLD AUTO: 0.1 % (ref 0–1.5)
BH BB BLOOD EXPIRATION DATE: NORMAL
BH BB BLOOD TYPE BARCODE: 600
BH BB DISPENSE STATUS: NORMAL
BH BB PRODUCT CODE: NORMAL
BH BB UNIT NUMBER: NORMAL
BILIRUB UR QL STRIP: NEGATIVE
BLD GP AB SCN SERPL QL: POSITIVE
CLARITY UR: CLEAR
COLOR UR: YELLOW
CROSSMATCH INTERPRETATION: NORMAL
DEPRECATED RDW RBC AUTO: 38.8 FL (ref 37–54)
EOSINOPHIL # BLD AUTO: 0.03 10*3/MM3 (ref 0–0.4)
EOSINOPHIL NFR BLD AUTO: 0.4 % (ref 0.3–6.2)
ERYTHROCYTE [DISTWIDTH] IN BLOOD BY AUTOMATED COUNT: 12.5 % (ref 12.3–15.4)
GLUCOSE UR STRIP-MCNC: NEGATIVE MG/DL
HCT VFR BLD AUTO: 38 % (ref 34–46.6)
HGB BLD-MCNC: 13.5 G/DL (ref 12–15.9)
HGB UR QL STRIP.AUTO: NEGATIVE
IMM GRANULOCYTES # BLD AUTO: 0.03 10*3/MM3 (ref 0–0.05)
IMM GRANULOCYTES NFR BLD AUTO: 0.4 % (ref 0–0.5)
KETONES UR QL STRIP: NEGATIVE
LEUKOCYTE ESTERASE UR QL STRIP.AUTO: NEGATIVE
LYMPHOCYTES # BLD AUTO: 1.68 10*3/MM3 (ref 0.7–3.1)
LYMPHOCYTES NFR BLD AUTO: 21.4 % (ref 19.6–45.3)
MCH RBC QN AUTO: 30.5 PG (ref 26.6–33)
MCHC RBC AUTO-ENTMCNC: 35.5 G/DL (ref 31.5–35.7)
MCV RBC AUTO: 85.8 FL (ref 79–97)
MONOCYTES # BLD AUTO: 0.61 10*3/MM3 (ref 0.1–0.9)
MONOCYTES NFR BLD AUTO: 7.8 % (ref 5–12)
NEUTROPHILS NFR BLD AUTO: 5.48 10*3/MM3 (ref 1.7–7)
NEUTROPHILS NFR BLD AUTO: 69.9 % (ref 42.7–76)
NITRITE UR QL STRIP: NEGATIVE
NRBC BLD AUTO-RTO: 0 /100 WBC (ref 0–0.2)
PH UR STRIP.AUTO: 7.5 [PH] (ref 5–8)
PLATELET # BLD AUTO: 154 10*3/MM3 (ref 140–450)
PMV BLD AUTO: 11.6 FL (ref 6–12)
PROT UR QL STRIP: NEGATIVE
RBC # BLD AUTO: 4.43 10*6/MM3 (ref 3.77–5.28)
RH BLD: NEGATIVE
RH BLD: NEGATIVE
SP GR UR STRIP: 1.01 (ref 1–1.03)
T&S EXPIRATION DATE: NORMAL
TREPONEMA PALLIDUM IGG+IGM AB [PRESENCE] IN SERUM OR PLASMA BY IMMUNOASSAY: NORMAL
UNIT  ABO: NORMAL
UNIT  RH: NORMAL
UROBILINOGEN UR QL STRIP: NORMAL
WBC NRBC COR # BLD AUTO: 7.84 10*3/MM3 (ref 3.4–10.8)

## 2024-11-08 PROCEDURE — S0260 H&P FOR SURGERY: HCPCS | Performed by: OBSTETRICS & GYNECOLOGY

## 2024-11-08 PROCEDURE — 25010000002 PROPOFOL 10 MG/ML EMULSION: Performed by: NURSE ANESTHETIST, CERTIFIED REGISTERED

## 2024-11-08 PROCEDURE — 25010000002 KETOROLAC TROMETHAMINE PER 15 MG: Performed by: OBSTETRICS & GYNECOLOGY

## 2024-11-08 PROCEDURE — 86920 COMPATIBILITY TEST SPIN: CPT

## 2024-11-08 PROCEDURE — 86900 BLOOD TYPING SEROLOGIC ABO: CPT | Performed by: OBSTETRICS & GYNECOLOGY

## 2024-11-08 PROCEDURE — 59025 FETAL NON-STRESS TEST: CPT

## 2024-11-08 PROCEDURE — 25010000002 MORPHINE PER 10 MG: Performed by: NURSE ANESTHETIST, CERTIFIED REGISTERED

## 2024-11-08 PROCEDURE — 59025 FETAL NON-STRESS TEST: CPT | Performed by: OBSTETRICS & GYNECOLOGY

## 2024-11-08 PROCEDURE — 51703 INSERT BLADDER CATH COMPLEX: CPT

## 2024-11-08 PROCEDURE — 86901 BLOOD TYPING SEROLOGIC RH(D): CPT | Performed by: OBSTETRICS & GYNECOLOGY

## 2024-11-08 PROCEDURE — 59510 CESAREAN DELIVERY: CPT | Performed by: OBSTETRICS & GYNECOLOGY

## 2024-11-08 PROCEDURE — 86780 TREPONEMA PALLIDUM: CPT | Performed by: OBSTETRICS & GYNECOLOGY

## 2024-11-08 PROCEDURE — 25010000002 OXYTOCIN PER 10 UNITS: Performed by: NURSE ANESTHETIST, CERTIFIED REGISTERED

## 2024-11-08 PROCEDURE — 25010000002 CEFAZOLIN PER 500 MG: Performed by: OBSTETRICS & GYNECOLOGY

## 2024-11-08 PROCEDURE — 25010000002 MIDAZOLAM PER 1MG: Performed by: NURSE ANESTHETIST, CERTIFIED REGISTERED

## 2024-11-08 PROCEDURE — 81003 URINALYSIS AUTO W/O SCOPE: CPT | Performed by: OBSTETRICS & GYNECOLOGY

## 2024-11-08 PROCEDURE — 25010000002 BUPIVACAINE IN DEXTROSE 0.75-8.25 % SOLUTION: Performed by: NURSE ANESTHETIST, CERTIFIED REGISTERED

## 2024-11-08 PROCEDURE — 86922 COMPATIBILITY TEST ANTIGLOB: CPT

## 2024-11-08 PROCEDURE — 25010000002 ONDANSETRON PER 1 MG: Performed by: OBSTETRICS & GYNECOLOGY

## 2024-11-08 PROCEDURE — 86901 BLOOD TYPING SEROLOGIC RH(D): CPT

## 2024-11-08 PROCEDURE — 25810000003 LACTATED RINGERS PER 1000 ML: Performed by: NURSE ANESTHETIST, CERTIFIED REGISTERED

## 2024-11-08 PROCEDURE — 85025 COMPLETE CBC W/AUTO DIFF WBC: CPT | Performed by: OBSTETRICS & GYNECOLOGY

## 2024-11-08 PROCEDURE — 25810000003 LACTATED RINGERS SOLUTION: Performed by: OBSTETRICS & GYNECOLOGY

## 2024-11-08 PROCEDURE — 86850 RBC ANTIBODY SCREEN: CPT | Performed by: OBSTETRICS & GYNECOLOGY

## 2024-11-08 PROCEDURE — 25010000002 METOCLOPRAMIDE PER 10 MG: Performed by: NURSE ANESTHETIST, CERTIFIED REGISTERED

## 2024-11-08 PROCEDURE — 86870 RBC ANTIBODY IDENTIFICATION: CPT | Performed by: OBSTETRICS & GYNECOLOGY

## 2024-11-08 PROCEDURE — 86900 BLOOD TYPING SEROLOGIC ABO: CPT

## 2024-11-08 PROCEDURE — 25010000002 ONDANSETRON PER 1 MG: Performed by: NURSE ANESTHETIST, CERTIFIED REGISTERED

## 2024-11-08 DEVICE — ABSORBABLE HEMOSTAT (OXIDIZED REGENERATED CELLULOSE)
Type: IMPLANTABLE DEVICE | Site: ABDOMEN | Status: FUNCTIONAL
Brand: SURGICEL

## 2024-11-08 RX ORDER — HYDROCORTISONE 25 MG/G
CREAM TOPICAL 3 TIMES DAILY PRN
Status: DISCONTINUED | OUTPATIENT
Start: 2024-11-08 | End: 2024-11-09 | Stop reason: HOSPADM

## 2024-11-08 RX ORDER — CITRIC ACID/SODIUM CITRATE 334-500MG
30 SOLUTION, ORAL ORAL ONCE
Status: COMPLETED | OUTPATIENT
Start: 2024-11-08 | End: 2024-11-08

## 2024-11-08 RX ORDER — BUPIVACAINE HCL/0.9 % NACL/PF 0.125 %
PLASTIC BAG, INJECTION (ML) EPIDURAL AS NEEDED
Status: DISCONTINUED | OUTPATIENT
Start: 2024-11-08 | End: 2024-11-08 | Stop reason: SURG

## 2024-11-08 RX ORDER — MIDAZOLAM HYDROCHLORIDE 2 MG/2ML
INJECTION, SOLUTION INTRAMUSCULAR; INTRAVENOUS AS NEEDED
Status: DISCONTINUED | OUTPATIENT
Start: 2024-11-08 | End: 2024-11-08 | Stop reason: SURG

## 2024-11-08 RX ORDER — EPHEDRINE SULFATE 5 MG/ML
INJECTION INTRAVENOUS AS NEEDED
Status: DISCONTINUED | OUTPATIENT
Start: 2024-11-08 | End: 2024-11-08 | Stop reason: SURG

## 2024-11-08 RX ORDER — OXYTOCIN/0.9 % SODIUM CHLORIDE 30/500 ML
333 PLASTIC BAG, INJECTION (ML) INTRAVENOUS ONCE
Status: DISCONTINUED | OUTPATIENT
Start: 2024-11-08 | End: 2024-11-08 | Stop reason: HOSPADM

## 2024-11-08 RX ORDER — LIDOCAINE HYDROCHLORIDE 10 MG/ML
0.5 INJECTION, SOLUTION INFILTRATION; PERINEURAL ONCE AS NEEDED
Status: DISCONTINUED | OUTPATIENT
Start: 2024-11-08 | End: 2024-11-08 | Stop reason: HOSPADM

## 2024-11-08 RX ORDER — ACETAMINOPHEN 500 MG
1000 TABLET ORAL ONCE
Status: COMPLETED | OUTPATIENT
Start: 2024-11-08 | End: 2024-11-08

## 2024-11-08 RX ORDER — OXYTOCIN/0.9 % SODIUM CHLORIDE 30/500 ML
125 PLASTIC BAG, INJECTION (ML) INTRAVENOUS ONCE AS NEEDED
Status: DISCONTINUED | OUTPATIENT
Start: 2024-11-08 | End: 2024-11-09 | Stop reason: HOSPADM

## 2024-11-08 RX ORDER — HYDROXYZINE HYDROCHLORIDE 25 MG/1
50 TABLET, FILM COATED ORAL EVERY 6 HOURS PRN
Status: DISCONTINUED | OUTPATIENT
Start: 2024-11-08 | End: 2024-11-09 | Stop reason: HOSPADM

## 2024-11-08 RX ORDER — PROMETHAZINE HYDROCHLORIDE 12.5 MG/1
12.5 SUPPOSITORY RECTAL EVERY 6 HOURS PRN
Status: DISCONTINUED | OUTPATIENT
Start: 2024-11-08 | End: 2024-11-09 | Stop reason: HOSPADM

## 2024-11-08 RX ORDER — BUPIVACAINE HYDROCHLORIDE 7.5 MG/ML
INJECTION, SOLUTION INTRASPINAL AS NEEDED
Status: DISCONTINUED | OUTPATIENT
Start: 2024-11-08 | End: 2024-11-08 | Stop reason: SURG

## 2024-11-08 RX ORDER — NALOXONE HCL 0.4 MG/ML
0.4 VIAL (ML) INJECTION
Status: ACTIVE | OUTPATIENT
Start: 2024-11-08 | End: 2024-11-09

## 2024-11-08 RX ORDER — FAMOTIDINE 10 MG/ML
20 INJECTION, SOLUTION INTRAVENOUS ONCE
Status: COMPLETED | OUTPATIENT
Start: 2024-11-08 | End: 2024-11-08

## 2024-11-08 RX ORDER — OXYCODONE HYDROCHLORIDE 5 MG/1
10 TABLET ORAL EVERY 4 HOURS PRN
Status: DISCONTINUED | OUTPATIENT
Start: 2024-11-08 | End: 2024-11-09 | Stop reason: HOSPADM

## 2024-11-08 RX ORDER — PRENATAL VIT/IRON FUM/FOLIC AC 27MG-0.8MG
1 TABLET ORAL DAILY
Status: DISCONTINUED | OUTPATIENT
Start: 2024-11-08 | End: 2024-11-09 | Stop reason: HOSPADM

## 2024-11-08 RX ORDER — MISOPROSTOL 200 UG/1
800 TABLET ORAL AS NEEDED
Status: DISCONTINUED | OUTPATIENT
Start: 2024-11-08 | End: 2024-11-08 | Stop reason: HOSPADM

## 2024-11-08 RX ORDER — NICOTINE 21 MG/24HR
1 PATCH, TRANSDERMAL 24 HOURS TRANSDERMAL EVERY 24 HOURS
Status: DISCONTINUED | OUTPATIENT
Start: 2024-11-08 | End: 2024-11-08

## 2024-11-08 RX ORDER — IBUPROFEN 800 MG/1
800 TABLET, FILM COATED ORAL EVERY 8 HOURS
Status: DISCONTINUED | OUTPATIENT
Start: 2024-11-08 | End: 2024-11-09 | Stop reason: HOSPADM

## 2024-11-08 RX ORDER — METHYLERGONOVINE MALEATE 0.2 MG/ML
200 INJECTION INTRAVENOUS ONCE AS NEEDED
Status: DISCONTINUED | OUTPATIENT
Start: 2024-11-08 | End: 2024-11-08 | Stop reason: HOSPADM

## 2024-11-08 RX ORDER — SODIUM CHLORIDE 0.9 % (FLUSH) 0.9 %
10 SYRINGE (ML) INJECTION EVERY 12 HOURS SCHEDULED
Status: DISCONTINUED | OUTPATIENT
Start: 2024-11-08 | End: 2024-11-08 | Stop reason: HOSPADM

## 2024-11-08 RX ORDER — SODIUM CHLORIDE 0.9 % (FLUSH) 0.9 %
10 SYRINGE (ML) INJECTION AS NEEDED
Status: DISCONTINUED | OUTPATIENT
Start: 2024-11-08 | End: 2024-11-08 | Stop reason: HOSPADM

## 2024-11-08 RX ORDER — SIMETHICONE 80 MG
80 TABLET,CHEWABLE ORAL 4 TIMES DAILY PRN
Status: DISCONTINUED | OUTPATIENT
Start: 2024-11-08 | End: 2024-11-09 | Stop reason: HOSPADM

## 2024-11-08 RX ORDER — ONDANSETRON 2 MG/ML
4 INJECTION INTRAMUSCULAR; INTRAVENOUS ONCE AS NEEDED
Status: DISCONTINUED | OUTPATIENT
Start: 2024-11-08 | End: 2024-11-09 | Stop reason: HOSPADM

## 2024-11-08 RX ORDER — CARBOPROST TROMETHAMINE 250 UG/ML
250 INJECTION, SOLUTION INTRAMUSCULAR AS NEEDED
Status: DISCONTINUED | OUTPATIENT
Start: 2024-11-08 | End: 2024-11-08 | Stop reason: HOSPADM

## 2024-11-08 RX ORDER — KETOROLAC TROMETHAMINE 30 MG/ML
30 INJECTION, SOLUTION INTRAMUSCULAR; INTRAVENOUS ONCE
Status: DISCONTINUED | OUTPATIENT
Start: 2024-11-08 | End: 2024-11-08 | Stop reason: HOSPADM

## 2024-11-08 RX ORDER — NALBUPHINE HYDROCHLORIDE 10 MG/ML
2 INJECTION INTRAMUSCULAR; INTRAVENOUS; SUBCUTANEOUS EVERY 4 HOURS PRN
Status: DISCONTINUED | OUTPATIENT
Start: 2024-11-08 | End: 2024-11-09 | Stop reason: HOSPADM

## 2024-11-08 RX ORDER — ONDANSETRON 2 MG/ML
4 INJECTION INTRAMUSCULAR; INTRAVENOUS EVERY 6 HOURS PRN
Status: DISCONTINUED | OUTPATIENT
Start: 2024-11-08 | End: 2024-11-09 | Stop reason: HOSPADM

## 2024-11-08 RX ORDER — ONDANSETRON 4 MG/1
4 TABLET, ORALLY DISINTEGRATING ORAL EVERY 8 HOURS PRN
Status: DISCONTINUED | OUTPATIENT
Start: 2024-11-08 | End: 2024-11-09 | Stop reason: HOSPADM

## 2024-11-08 RX ORDER — ONDANSETRON 2 MG/ML
INJECTION INTRAMUSCULAR; INTRAVENOUS AS NEEDED
Status: DISCONTINUED | OUTPATIENT
Start: 2024-11-08 | End: 2024-11-08 | Stop reason: SURG

## 2024-11-08 RX ORDER — PROMETHAZINE HYDROCHLORIDE 25 MG/1
25 TABLET ORAL EVERY 6 HOURS PRN
Status: DISCONTINUED | OUTPATIENT
Start: 2024-11-08 | End: 2024-11-09 | Stop reason: HOSPADM

## 2024-11-08 RX ORDER — OXYTOCIN 10 [USP'U]/ML
INJECTION, SOLUTION INTRAMUSCULAR; INTRAVENOUS AS NEEDED
Status: DISCONTINUED | OUTPATIENT
Start: 2024-11-08 | End: 2024-11-08 | Stop reason: SURG

## 2024-11-08 RX ORDER — NALBUPHINE HYDROCHLORIDE 10 MG/ML
10 INJECTION INTRAMUSCULAR; INTRAVENOUS; SUBCUTANEOUS EVERY 4 HOURS PRN
Status: DISCONTINUED | OUTPATIENT
Start: 2024-11-08 | End: 2024-11-09 | Stop reason: HOSPADM

## 2024-11-08 RX ORDER — MORPHINE SULFATE 2 MG/ML
2 INJECTION, SOLUTION INTRAMUSCULAR; INTRAVENOUS
Status: ACTIVE | OUTPATIENT
Start: 2024-11-08 | End: 2024-11-09

## 2024-11-08 RX ORDER — SODIUM CHLORIDE, SODIUM LACTATE, POTASSIUM CHLORIDE, CALCIUM CHLORIDE 600; 310; 30; 20 MG/100ML; MG/100ML; MG/100ML; MG/100ML
INJECTION, SOLUTION INTRAVENOUS CONTINUOUS PRN
Status: DISCONTINUED | OUTPATIENT
Start: 2024-11-08 | End: 2024-11-08 | Stop reason: SURG

## 2024-11-08 RX ORDER — OXYTOCIN/0.9 % SODIUM CHLORIDE 30/500 ML
42 PLASTIC BAG, INJECTION (ML) INTRAVENOUS AS NEEDED
Status: DISCONTINUED | OUTPATIENT
Start: 2024-11-08 | End: 2024-11-08 | Stop reason: HOSPADM

## 2024-11-08 RX ORDER — ACETAMINOPHEN 500 MG
1000 TABLET ORAL EVERY 8 HOURS
Status: DISCONTINUED | OUTPATIENT
Start: 2024-11-08 | End: 2024-11-09 | Stop reason: HOSPADM

## 2024-11-08 RX ORDER — OXYCODONE HYDROCHLORIDE 5 MG/1
5 TABLET ORAL EVERY 4 HOURS PRN
Status: DISCONTINUED | OUTPATIENT
Start: 2024-11-08 | End: 2024-11-09 | Stop reason: HOSPADM

## 2024-11-08 RX ORDER — METOCLOPRAMIDE HYDROCHLORIDE 5 MG/ML
INJECTION INTRAMUSCULAR; INTRAVENOUS AS NEEDED
Status: DISCONTINUED | OUTPATIENT
Start: 2024-11-08 | End: 2024-11-08 | Stop reason: SURG

## 2024-11-08 RX ORDER — KETOROLAC TROMETHAMINE 30 MG/ML
30 INJECTION, SOLUTION INTRAMUSCULAR; INTRAVENOUS EVERY 6 HOURS PRN
Status: DISCONTINUED | OUTPATIENT
Start: 2024-11-08 | End: 2024-11-09 | Stop reason: HOSPADM

## 2024-11-08 RX ORDER — PROPOFOL 10 MG/ML
VIAL (ML) INTRAVENOUS AS NEEDED
Status: DISCONTINUED | OUTPATIENT
Start: 2024-11-08 | End: 2024-11-08 | Stop reason: SURG

## 2024-11-08 RX ORDER — BUSPIRONE HYDROCHLORIDE 10 MG/1
10 TABLET ORAL 2 TIMES DAILY
Status: DISCONTINUED | OUTPATIENT
Start: 2024-11-08 | End: 2024-11-09 | Stop reason: HOSPADM

## 2024-11-08 RX ORDER — MORPHINE SULFATE 1 MG/ML
INJECTION, SOLUTION EPIDURAL; INTRATHECAL; INTRAVENOUS AS NEEDED
Status: DISCONTINUED | OUTPATIENT
Start: 2024-11-08 | End: 2024-11-08 | Stop reason: SURG

## 2024-11-08 RX ADMIN — KETOROLAC TROMETHAMINE 30 MG: 30 INJECTION, SOLUTION INTRAMUSCULAR; INTRAVENOUS at 09:45

## 2024-11-08 RX ADMIN — ONDANSETRON 4 MG: 2 INJECTION INTRAMUSCULAR; INTRAVENOUS at 15:42

## 2024-11-08 RX ADMIN — Medication 200 MCG: at 07:31

## 2024-11-08 RX ADMIN — MORPHINE SULFATE 0.2 MG: 1 INJECTION, SOLUTION EPIDURAL; INTRATHECAL; INTRAVENOUS at 07:29

## 2024-11-08 RX ADMIN — SODIUM CHLORIDE, POTASSIUM CHLORIDE, SODIUM LACTATE AND CALCIUM CHLORIDE: 600; 310; 30; 20 INJECTION, SOLUTION INTRAVENOUS at 07:25

## 2024-11-08 RX ADMIN — KETOROLAC TROMETHAMINE 30 MG: 30 INJECTION, SOLUTION INTRAMUSCULAR; INTRAVENOUS at 15:41

## 2024-11-08 RX ADMIN — EPHEDRINE SULFATE 10 MG: 5 INJECTION INTRAVENOUS at 07:49

## 2024-11-08 RX ADMIN — Medication 200 MCG: at 07:48

## 2024-11-08 RX ADMIN — FAMOTIDINE 20 MG: 10 INJECTION, SOLUTION INTRAVENOUS at 06:32

## 2024-11-08 RX ADMIN — OXYTOCIN 20 UNITS: 10 INJECTION, SOLUTION INTRAMUSCULAR; INTRAVENOUS at 08:28

## 2024-11-08 RX ADMIN — SODIUM CHLORIDE 2 G: 9 INJECTION, SOLUTION INTRAVENOUS at 07:30

## 2024-11-08 RX ADMIN — Medication 300 MCG: at 07:45

## 2024-11-08 RX ADMIN — PROPOFOL 20 MG: 10 INJECTION, EMULSION INTRAVENOUS at 07:26

## 2024-11-08 RX ADMIN — SODIUM CITRATE AND CITRIC ACID MONOHYDRATE 30 ML: 500; 334 SOLUTION ORAL at 06:32

## 2024-11-08 RX ADMIN — SODIUM CHLORIDE, POTASSIUM CHLORIDE, SODIUM LACTATE AND CALCIUM CHLORIDE 2000 ML: 600; 310; 30; 20 INJECTION, SOLUTION INTRAVENOUS at 06:34

## 2024-11-08 RX ADMIN — ACETAMINOPHEN 1000 MG: 500 TABLET, FILM COATED ORAL at 06:34

## 2024-11-08 RX ADMIN — Medication 300 MCG: at 07:34

## 2024-11-08 RX ADMIN — Medication 200 MCG: at 08:02

## 2024-11-08 RX ADMIN — Medication 200 MCG: at 07:57

## 2024-11-08 RX ADMIN — OXYTOCIN 20 UNITS: 10 INJECTION, SOLUTION INTRAMUSCULAR; INTRAVENOUS at 08:05

## 2024-11-08 RX ADMIN — BUSPIRONE HYDROCHLORIDE 10 MG: 10 TABLET ORAL at 20:27

## 2024-11-08 RX ADMIN — MIDAZOLAM HYDROCHLORIDE 1 MG: 1 INJECTION, SOLUTION INTRAMUSCULAR; INTRAVENOUS at 08:22

## 2024-11-08 RX ADMIN — METOCLOPRAMIDE HYDROCHLORIDE 10 MG: 5 INJECTION, SOLUTION INTRAMUSCULAR; INTRAVENOUS at 07:51

## 2024-11-08 RX ADMIN — ONDANSETRON 4 MG: 2 INJECTION INTRAMUSCULAR; INTRAVENOUS at 07:25

## 2024-11-08 RX ADMIN — Medication 200 MCG: at 07:37

## 2024-11-08 RX ADMIN — ACETAMINOPHEN 1000 MG: 500 TABLET, FILM COATED ORAL at 17:00

## 2024-11-08 RX ADMIN — BUPIVACAINE HYDROCHLORIDE IN DEXTROSE 1.5 ML: 7.5 INJECTION, SOLUTION SUBARACHNOID at 07:29

## 2024-11-08 RX ADMIN — MIDAZOLAM HYDROCHLORIDE 1 MG: 1 INJECTION, SOLUTION INTRAMUSCULAR; INTRAVENOUS at 08:05

## 2024-11-08 NOTE — ANESTHESIA POSTPROCEDURE EVALUATION
Patient: Sandhya Marte    Procedure Summary       Date: 24 Room / Location: [facility] OR  /  [facility]    Anesthesia Start: 725 Anesthesia Stop: 856    Procedure:  SECTION REPEAT (Abdomen) Diagnosis:       HTN in pregnancy, chronic      Previous  section      (HTN in pregnancy, chronic [O10.919])      (Previous  section [Z98.891])    Surgeons: Liu Paniagua MD Provider: Salbador Fu CRNA    Anesthesia Type: ITN, spinal ASA Status: 2            Anesthesia Type: ITN, spinal    Vitals  Vitals Value Taken Time   /67 24 1016   Temp 97.5 °F (36.4 °C) 24 1003   Pulse 67 24 1025   Resp 16 24 1003   SpO2 99 % 24 1025   Vitals shown include unfiled device data.        Post Anesthesia Care and Evaluation    Patient location during evaluation: floor  Patient participation: complete - patient participated  Level of consciousness: awake and alert  Pain score: 0  Pain management: adequate    Airway patency: patent  Anesthetic complications: No anesthetic complications  PONV Status: none  Cardiovascular status: acceptable  Respiratory status: acceptable  Hydration status: acceptable  No anesthesia care post op

## 2024-11-08 NOTE — ANESTHESIA PREPROCEDURE EVALUATION
Anesthesia Evaluation     Patient summary reviewed and Nursing notes reviewed   NPO Solid Status: > 8 hours  NPO Liquid Status: > 8 hours           Airway   Mallampati: II  TM distance: >3 FB  Neck ROM: full  Possible difficult intubation  Dental - normal exam     Pulmonary - negative pulmonary ROS   Cardiovascular - negative cardio ROS        Neuro/Psych- negative ROS  GI/Hepatic/Renal/Endo - negative ROS     Musculoskeletal (-) negative ROS    Abdominal    Substance History - negative use     OB/GYN    (+) Pregnant, Preeclampsia, pregnancy induced hypertension        Other        ROS/Med Hx Other: Plt 154  H/H 13.5/38                    Anesthesia Plan    ASA 2     ITN and spinal     intravenous induction     Anesthetic plan, risks, benefits, and alternatives have been provided, discussed and informed consent has been obtained with: patient.  Pre-procedure education provided  Plan discussed with CRNA.        CODE STATUS:    Level Of Support Discussed With: Patient  Code Status (Patient has no pulse and is not breathing): CPR (Attempt to Resuscitate)  Medical Interventions (Patient has pulse or is breathing): Full Support

## 2024-11-08 NOTE — NON STRESS TEST
"  Sandhya Marte, a  at 38w3d with an YASMIN of 2024, by Last Menstrual Period, was seen at [facility] for a nonstress test.    Chief Complaint   Patient presents with    Scheduled      \"I am here for my csection.\"       Patient Active Problem List   Diagnosis    Previous  section    Anxiety    Short interval between pregnancies affecting pregnancy, antepartum    Anxiety during pregnancy    Rh negative status during pregnancy in second trimester    Chronic hypertension in pregnancy    HTN in pregnancy, chronic       Start Time: 0510  Stop Time: 0600    Interpretation A  Nonstress Test Interpretation A: Reactive                 "

## 2024-11-08 NOTE — PAYOR COMM NOTE
"TO:MEDARE  FROM:SHAAN SNOW, RN PHONE [phone]  CLINICALS REF# [id number]    Sandhya Valenzuela (29 y.o. Female)       Date of Birth   1995    Social Security Number       Address   [address] [place], [place] [address]    Home Phone       MRN   [id number]       Jewish   Unknown    Marital Status                               Admission Date   24    Admission Type   Elective    Admitting Provider   Liu Paniagua MD    Attending Provider   Liu Paniagua MD    Department, Room/Bed   [facility], [facility]       Discharge Date       Discharge Disposition       Discharge Destination                                 Attending Provider: Liu Paniagua MD    Allergies: No Known Allergies    Isolation: None   Infection: None   Code Status: CPR    Ht: 160 cm (63\")   Wt: 75.3 kg (166 lb)    Admission Cmt: None   Principal Problem: Chronic hypertension in pregnancy [O10.919]                   Active Insurance as of 2024       Primary Coverage       Payor Plan Insurance Group Employer/Plan Group    MISC SHARING PLAN MISC SHARING PLAN        Coverage Address Coverage Phone Number Coverage Fax Number Effective Dates    [address] [phone]  2023 - None Entered    [facility] [id number]         Subscriber Name Subscriber Birth Date Member ID       SANDHYA VALENZUELA 1995 [id number]                     Emergency Contacts        (Rel.) Home Phone Work Phone Mobile Phone    IVÁN VALENZUELA (Spouse) [phone] -- --                 History & Physical        Liu Paniagua MD at 24 0710          OBSTETRICS HISTORY AND PHYSICAL    CHIEF COMPLAINT: Scheduled C/S    DIAGNOSIS:  IUP at 38w3d  Previous C/S with planned repeat C/S  Transverse presentation, back down  Short interval pregnancy  Chronic hypertension, no medication  History of anxiety, on medication  Rh NEG    ASSESSMENT/PLAN     29 y.o.  at 38w3d who " presents for scheduled C/S     #  section  - Risks for the procedure were reviewed  - Hgb 13.5  - Placenta anterior    # Fetal Wellbeing   - Fetal tracing: Cat 1, reactive  - Ultrasound: reviewed   - Group B Streptococcus: negative   - Presentation: transverse, back down confirmed by recent U/S --> discussed uterine incisions    # Routine Obstetrics  - Labs reviewed  - MBT: A NEG    HISTORY OF PRESENT ILLNESS     29 y.o.  at 38w3d who presents for scheduled C/S     OB Review of Systems:  Fetal movement: active  Vaginal bleeding: no  Loss of fluid: no  Contractions: no    Preeclampsia Symptoms Review:  Headaches: no  Vision changes:no  Chest pain and/or shortness of breath: no  RUQ pain: no    REVIEW OF SYSTEMS: A complete review of systems was performed and was specifically negative for headache, changes in vision, RUQ pain, shortness of breath, chest pain, lower extremity edema and dysuria.     HISTORY:  Obstetrical History:  OB History    Para Term  AB Living   2 1   1   1   SAB IAB Ectopic Molar Multiple Live Births             1      # Outcome Date GA Lbr Wenceslao/2nd Weight Sex Type Anes PTL Lv   2 Current            1  23 37w5d 06:00 / 01:46 2770 g (6 lb 1.7 oz) M CS-LTranv  N NASREEN       Past Medical History:  Past Medical History:   Diagnosis Date    Abnormal Pap smear of cervix 2021    Anxiety 2023    Gestational hypertension 2023    HPV (human papilloma virus) infection 2021    Hypertension 2023    Preeclampsia 2023       Past Surgical History:  Past Surgical History:   Procedure Laterality Date     SECTION      KNEE ARTHROSCOPY W/ ACL RECONSTRUCTION Right     WISDOM TOOTH EXTRACTION  2015       Social History:  Social History     Tobacco Use    Smoking status: Never    Smokeless tobacco: Never   Vaping Use    Vaping status: Never Used   Substance Use Topics    Alcohol use: Not Currently     Comment: occ.     Drug use: Never        Family History  Family History   Problem Relation Age of Onset    Hypertension Mother         she had this when she was pregnant with me    Melanoma Maternal Grandmother           OBJECTIVE   VITALS:  Temp:  [97.7 °F (36.5 °C)] 97.7 °F (36.5 °C)  Heart Rate:  [89-95] 89  Resp:  [16] 16  BP: ()/(64-79) 99/64    PHYSICAL EXAM:  GENERAL: NAD, alert  CHEST: No increased work of breathing, CTAB  CV: RRR, WWP  ABDOMEN: Gravid, nontender  EXTREMITIES:  Warm and well-perfused, nontender, nonedematous  NEURO: AAO x 3, CN II-XII grossly intact    Fetal Monitoring:  Cat 1, reactive     Allergies: No Known Allergies    No current facility-administered medications on file prior to encounter.     Current Outpatient Medications on File Prior to Encounter   Medication Sig Dispense Refill    ASPIRIN ADULT PO Take  by mouth.      busPIRone (BUSPAR) 5 MG tablet Take 2 tablets by mouth 2 (Two) Times a Day. 60 tablet 5    prenatal vitamin (prenatal, CLASSIC, vitamin) tablet Take  by mouth Daily.         DIAGNOSTIC STUDIES:  Results from last 7 days   Lab Units 11/08/24  0552   WBC 10*3/mm3 7.84   HEMOGLOBIN g/dL 13.5   HEMATOCRIT % 38.0   PLATELETS 10*3/mm3 154       Recent Results (from the past 24 hours)   Urinalysis With Culture If Indicated - Urine, Clean Catch    Collection Time: 11/08/24  5:43 AM    Specimen: Urine, Clean Catch   Result Value Ref Range    Color, UA Yellow Yellow, Straw    Appearance, UA Clear Clear    pH, UA 7.5 5.0 - 8.0    Specific Gravity, UA 1.011 1.005 - 1.030    Glucose, UA Negative Negative    Ketones, UA Negative Negative    Bilirubin, UA Negative Negative    Blood, UA Negative Negative    Protein, UA Negative Negative    Leuk Esterase, UA Negative Negative    Nitrite, UA Negative Negative    Urobilinogen, UA 0.2 E.U./dL 0.2 - 1.0 E.U./dL   CBC Auto Differential    Collection Time: 11/08/24  5:52 AM    Specimen: Blood   Result Value Ref Range    WBC 7.84 3.40 - 10.80 10*3/mm3    RBC 4.43 3.77 -  5.28 10*6/mm3    Hemoglobin 13.5 12.0 - 15.9 g/dL    Hematocrit 38.0 34.0 - 46.6 %    MCV 85.8 79.0 - 97.0 fL    MCH 30.5 26.6 - 33.0 pg    MCHC 35.5 31.5 - 35.7 g/dL    RDW 12.5 12.3 - 15.4 %    RDW-SD 38.8 37.0 - 54.0 fl    MPV 11.6 6.0 - 12.0 fL    Platelets 154 140 - 450 10*3/mm3    Neutrophil % 69.9 42.7 - 76.0 %    Lymphocyte % 21.4 19.6 - 45.3 %    Monocyte % 7.8 5.0 - 12.0 %    Eosinophil % 0.4 0.3 - 6.2 %    Basophil % 0.1 0.0 - 1.5 %    Immature Grans % 0.4 0.0 - 0.5 %    Neutrophils, Absolute 5.48 1.70 - 7.00 10*3/mm3    Lymphocytes, Absolute 1.68 0.70 - 3.10 10*3/mm3    Monocytes, Absolute 0.61 0.10 - 0.90 10*3/mm3    Eosinophils, Absolute 0.03 0.00 - 0.40 10*3/mm3    Basophils, Absolute 0.01 0.00 - 0.20 10*3/mm3    Immature Grans, Absolute 0.03 0.00 - 0.05 10*3/mm3    nRBC 0.0 0.0 - 0.2 /100 WBC       Liu Paniagua MD  Obstetrics and Gynecology  [facility]          Electronically signed by Liu Paniagua MD at 11/08/24 0715       Vital Signs (last day)       Date/Time Temp Temp src Pulse Resp BP Patient Position SpO2    11/08/24 1145 -- -- 82 16 103/68 Lying 97    11/08/24 1115 -- -- 76 16 110/76 Sitting 100    11/08/24 1045 97.6 (36.4) Oral 76 16 121/79 Lying 100    11/08/24 1003 97.5 (36.4) Oral 75 16 108/65 Lying 100    11/08/24 0956 -- -- 75 -- -- -- 98    11/08/24 0951 -- -- 79 -- -- -- 98    11/08/24 0946 -- -- 76 -- 96/60 -- 97    11/08/24 0916 -- -- 80 16 105/62 Lying 98    11/08/24 0911 -- -- 79 18 108/75 Lying 99    11/08/24 0906 -- -- 91 16 103/62 Lying 99    11/08/24 0905 -- -- 83 -- -- -- 97    11/08/24 0904 97.6 (36.4) Oral 77 16 101/57 Lying 100    11/08/24 0600 -- -- 89 -- 99/64 Lying 99    11/08/24 0545 -- -- 92 -- 100/79 -- 99    11/08/24 0530 -- -- 89 -- -- -- 99    11/08/24 0515 -- -- 90 -- 113/72 -- 100    11/08/24 0513 97.7 (36.5) Oral 95 16 113/72 Lying 100          Current Facility-Administered Medications   Medication Dose Route Frequency Provider Last Rate  Last Admin    acetaminophen (TYLENOL) tablet 1,000 mg  1,000 mg Oral Q8H Liu Paniagua MD        busPIRone (BUSPAR) tablet 10 mg  10 mg Oral BID Liu Paniagua MD        Hydrocortisone (Perianal) (ANUSOL-HC) 2.5 % rectal cream   Rectal TID PRN Liu Paniagua MD        hydrOXYzine (ATARAX) tablet 50 mg  50 mg Oral Q6H PRN Liu Paniagua MD        ibuprofen (ADVIL,MOTRIN) tablet 800 mg  800 mg Oral Q8H Liu Paniagua MD        influenza virus vacc split PF FLUZONE 0.5 mL  0.5 mL Intramuscular Once PRN Liu Paniagua MD        ketorolac (TORADOL) injection 30 mg  30 mg Intravenous Q6H PRN Liu Paniagua MD   30 mg at 11/08/24 0945    Measles, Mumps & Rubella Vac (MMR) injection 0.5 mL  0.5 mL Subcutaneous Once PRN Liu Paniagua MD        Morphine sulfate (PF) injection 2 mg  2 mg Intravenous Q1H PRN Salbador Fu CRNA        nalbuphine (NUBAIN) injection 10 mg  10 mg Intravenous Q4H PRN Salbador Fu CRNA        Or    nalbuphine (NUBAIN) injection 2 mg  2 mg Intramuscular Q4H PRN Salbador Fu CRNA        naloxone (NARCAN) injection 0.4 mg  0.4 mg Intravenous Q1H PRN Salbador Fu CRNA        ondansetron (ZOFRAN) injection 4 mg  4 mg Intravenous Once PRN Salbador Fu CRNA        ondansetron (ZOFRAN) injection 4 mg  4 mg Intravenous Q6H PRN Liu Paniagua MD        ondansetron ODT (ZOFRAN-ODT) disintegrating tablet 4 mg  4 mg Oral Q8H PRLiu Germain MD        oxyCODONE (ROXICODONE) immediate release tablet 5 mg  5 mg Oral Q4H PRN Liu Paniagua MD        Or    oxyCODONE (ROXICODONE) immediate release tablet 10 mg  10 mg Oral Q4H PRN Liu Paniagua MD        oxytocin (PITOCIN) 30 units in 0.9% sodium chloride 500 mL (premix)  125 mL/hr Intravenous Once PRN Liu Paniagua MD        prenatal vitamin tablet 1 tablet  1 tablet Oral Daily Liu Paniagua MD        promethazine (PHENERGAN)  tablet 25 mg  25 mg Oral Q6H PRN Liu Paniagua MD        Or    promethazine (PHENERGAN) suppository 12.5 mg  12.5 mg Rectal Q6H PRN Liu Paniagua MD        simethicone (MYLICON) chewable tablet 80 mg  80 mg Oral 4x Daily PRN Liu Paniagua MD         Lab Results (last 24 hours)       Procedure Component Value Units Date/Time    Treponema pallidum AB w/Reflex RPR [[id number]]  (Normal) Collected: 11/08/24 0552    Specimen: Blood Updated: 11/08/24 1234     Treponemal AB Total Non-Reactive    Narrative:      Reactive results will reflex RPR testing.    Urinalysis With Culture If Indicated - Urine, Clean Catch [[id number]]  (Normal) Collected: 11/08/24 0543    Specimen: Urine, Clean Catch Updated: 11/08/24 0617     Color, UA Yellow     Appearance, UA Clear     pH, UA 7.5     Specific Gravity, UA 1.011     Glucose, UA Negative     Ketones, UA Negative     Bilirubin, UA Negative     Blood, UA Negative     Protein, UA Negative     Leuk Esterase, UA Negative     Nitrite, UA Negative     Urobilinogen, UA 0.2 E.U./dL    Narrative:      In absence of clinical symptoms, the presence of pyuria, bacteria, and/or nitrites on the urinalysis result does not correlate with infection.  Urine microscopic not indicated.    CBC & Differential [[id number]]  (Normal) Collected: 11/08/24 0552    Specimen: Blood Updated: 11/08/24 0605    Narrative:      The following orders were created for panel order CBC & Differential.  Procedure                               Abnormality         Status                     ---------                               -----------         ------                     CBC Auto Differential[[id number]]        Normal              Final result                 Please view results for these tests on the individual orders.    CBC Auto Differential [[id number]]  (Normal) Collected: 11/08/24 0552    Specimen: Blood Updated: 11/08/24 0605     WBC 7.84 10*3/mm3      RBC 4.43 10*6/mm3      Hemoglobin 13.5 g/dL       Hematocrit 38.0 %      MCV 85.8 fL      MCH 30.5 pg      MCHC 35.5 g/dL      RDW 12.5 %      RDW-SD 38.8 fl      MPV 11.6 fL      Platelets 154 10*3/mm3      Neutrophil % 69.9 %      Lymphocyte % 21.4 %      Monocyte % 7.8 %      Eosinophil % 0.4 %      Basophil % 0.1 %      Immature Grans % 0.4 %      Neutrophils, Absolute 5.48 10*3/mm3      Lymphocytes, Absolute 1.68 10*3/mm3      Monocytes, Absolute 0.61 10*3/mm3      Eosinophils, Absolute 0.03 10*3/mm3      Basophils, Absolute 0.01 10*3/mm3      Immature Grans, Absolute 0.03 10*3/mm3      nRBC 0.0 /100 WBC           Imaging Results (Last 24 Hours)       ** No results found for the last 24 hours. **          Operative/Procedure Notes (last 24 hours)  Notes from 11/07/24 1240 through 11/08/24 1240   No notes of this type exist for this encounter.       Physician Progress Notes (last 24 hours)  Notes from 11/07/24 1240 through 11/08/24 1240   No notes of this type exist for this encounter.

## 2024-11-08 NOTE — OP NOTE
Jean Pierre Marte  : 1995  MRN: [id number]  CSN: [id number]    Operative Report    Pre-Operative Dx:   IUP at 38w3d weeks   Previous  section - not a  candidate  Transverse presentation, back down, head maternal right  Short interval pregnancy  Chronic hypertension, no medication  History of anxiety, on medication  Rh NEG      Postoperative dx:    Same     Procedure: Repeat  (LTCS)       Surgeon:    Surgical assistant: MARY Rebolledo was responsible for performing the following activities: Retraction, Suction, Placing Dressing, and Delivery of Fetus and their skilled assistance was necessary for the success of this case.       OR Staff:   Circulator: Anju Eaton RN; Digna Tipton RN  Scrub Person: Loree Huston PCT; Payton Rm  Baby Nurse: Heather Jarrett RN  Other: Nidhi Nascimento RN       Anesthesia: Spinal       EBL: 850 mls.       Antibiotics: cefazolin 2 gms     Drains:    Specimens: Bledsoe    None     Findings:  Normal appearing uterus, fallopian tubes and ovaries. Very thin lower uterine segment - uterine window.    Infant details        Infant observations: Weight: 3317 g (7 lb 5 oz)  Length: 19.5 in   Apgars: 9  @ 1 minute /    9  @ 5 minutes     Procedure Details:   The patient was taken to the operating room where regional anesthesia was found to be adequate. She was prepared and draped in the normal sterile fashion in the dorsal supine position with a leftward tilt. A Pfannenstiel skin incision was made with the scalpel and carried through to the underlying layer of fascia. The fascia was incised in the midline and the incision extended laterally with the Sams scissors. The superior aspect of the fascial incision was grasped with the Kocher clamps, elevated and the underlying rectus muscles dissected off sharply. Attention was then turned to the inferior aspect of the fascial incision, which was grasped and tented up with the Kocher  clamps. The underlying muscles were dissected off in a similar fashion. The rectus muscles were then  in the midline, and the peritoneum identified, and entered bluntly. The peritoneal incision was extended superiorly and inferiorly with good visualization of the bladder. The Skyler retractor was inserted atraumatically. The vesicouterine peritoneum was identified, grasped with the pickups, entered sharply, and the bladder flap was created digitally.     A low transverse uterine incision was made with the scalpel well above the bladder reflection and extended in a cephalad/caudad fashion. I was able to reach the infant's head and bring it to the hysterotomy.  The head was delivered atraumatically followed by the shoulders and body. The infant was vigorous and cord clamping was delayed x 30 seconds with stimulation and suctioning of the nose and mouth. The cord was then clamped and cut and the infant was handed off to waiting staff.     The placenta was then removed with gentle traction on the cord and uterine massage.  The uterus was cleared of all clots and debris with a wet lap sponge.  The uterine incision was repaired with a 0 monocryl in a running locked fashion. The Skyler retractor was removed and the gutters were cleared of all clots.  The uterine incision was reinspected and Surgicel was placed.    The fascia was elevated and the rectus muscles and subfascial tissues were made hemostatic with the bovie. The peritoneum was closed with 3-0 chromic in a running fashion. The fascia was closed with 0 PDS in a running fashion.  The subcutaneous tissues were then irrigated and the bovie was used to achieve satisfactory hemostasis. The subcutaneous space was closed with 3-0 chromic. The skin was closed with 3-0 Monocryl suture in a subcuticular fashion. Sponge, lap, needle, and instrument counts were correct per the OR staff.  The patient tolerated the procedure well and was taken to the recovery room in  stable condition. She will be admitted to the postpartum unit for her post-operative care.      Complications:   None      Disposition:   Mother to Mother Baby/Postpartum in stable condition currently.   Baby to remain with the mother in stable condition currently.     Liu Paniagua MD  Obstetrics and Gynecology  [facility]  11/8/2024  09:27 EST

## 2024-11-08 NOTE — H&P
OBSTETRICS HISTORY AND PHYSICAL    CHIEF COMPLAINT: Scheduled C/S    DIAGNOSIS:  IUP at 38w3d  Previous C/S with planned repeat C/S  Transverse presentation, back down  Short interval pregnancy  Chronic hypertension, no medication  History of anxiety, on medication  Rh NEG    ASSESSMENT/PLAN     29 y.o.  at 38w3d who presents for scheduled C/S     #  section  - Risks for the procedure were reviewed  - Hgb 13.5  - Placenta anterior    # Fetal Wellbeing   - Fetal tracing: Cat 1, reactive  - Ultrasound: reviewed   - Group B Streptococcus: negative   - Presentation: transverse, back down confirmed by recent U/S --> discussed uterine incisions    # Routine Obstetrics  - Labs reviewed  - MBT: A NEG    HISTORY OF PRESENT ILLNESS     29 y.o.  at 38w3d who presents for scheduled C/S     OB Review of Systems:  Fetal movement: active  Vaginal bleeding: no  Loss of fluid: no  Contractions: no    Preeclampsia Symptoms Review:  Headaches: no  Vision changes:no  Chest pain and/or shortness of breath: no  RUQ pain: no    REVIEW OF SYSTEMS: A complete review of systems was performed and was specifically negative for headache, changes in vision, RUQ pain, shortness of breath, chest pain, lower extremity edema and dysuria.     HISTORY:  Obstetrical History:  OB History    Para Term  AB Living   2 1   1   1   SAB IAB Ectopic Molar Multiple Live Births             1      # Outcome Date GA Lbr Wenceslao/2nd Weight Sex Type Anes PTL Lv   2 Current            1  23 37w5d 06:00 / 01:46 2770 g (6 lb 1.7 oz) M CS-LTranv  N NASREEN       Past Medical History:  Past Medical History:   Diagnosis Date    Abnormal Pap smear of cervix 2021    Anxiety 2023    Gestational hypertension 2023    HPV (human papilloma virus) infection 2021    Hypertension 2023    Preeclampsia 2023       Past Surgical History:  Past Surgical History:   Procedure Laterality Date     SECTION      KNEE  ARTHROSCOPY W/ ACL RECONSTRUCTION Right     WISDOM TOOTH EXTRACTION  05/1/2015       Social History:  Social History     Tobacco Use    Smoking status: Never    Smokeless tobacco: Never   Vaping Use    Vaping status: Never Used   Substance Use Topics    Alcohol use: Not Currently     Comment: occ.     Drug use: Never       Family History  Family History   Problem Relation Age of Onset    Hypertension Mother         she had this when she was pregnant with me    Melanoma Maternal Grandmother           OBJECTIVE   VITALS:  Temp:  [97.7 °F (36.5 °C)] 97.7 °F (36.5 °C)  Heart Rate:  [89-95] 89  Resp:  [16] 16  BP: ()/(64-79) 99/64    PHYSICAL EXAM:  GENERAL: NAD, alert  CHEST: No increased work of breathing, CTAB  CV: RRR, WWP  ABDOMEN: Gravid, nontender  EXTREMITIES:  Warm and well-perfused, nontender, nonedematous  NEURO: AAO x 3, CN II-XII grossly intact    Fetal Monitoring:  Cat 1, reactive     Allergies: No Known Allergies    No current facility-administered medications on file prior to encounter.     Current Outpatient Medications on File Prior to Encounter   Medication Sig Dispense Refill    ASPIRIN ADULT PO Take  by mouth.      busPIRone (BUSPAR) 5 MG tablet Take 2 tablets by mouth 2 (Two) Times a Day. 60 tablet 5    prenatal vitamin (prenatal, CLASSIC, vitamin) tablet Take  by mouth Daily.         DIAGNOSTIC STUDIES:  Results from last 7 days   Lab Units 11/08/24  0552   WBC 10*3/mm3 7.84   HEMOGLOBIN g/dL 13.5   HEMATOCRIT % 38.0   PLATELETS 10*3/mm3 154       Recent Results (from the past 24 hours)   Urinalysis With Culture If Indicated - Urine, Clean Catch    Collection Time: 11/08/24  5:43 AM    Specimen: Urine, Clean Catch   Result Value Ref Range    Color, UA Yellow Yellow, Straw    Appearance, UA Clear Clear    pH, UA 7.5 5.0 - 8.0    Specific Gravity, UA 1.011 1.005 - 1.030    Glucose, UA Negative Negative    Ketones, UA Negative Negative    Bilirubin, UA Negative Negative    Blood, UA Negative  Negative    Protein, UA Negative Negative    Leuk Esterase, UA Negative Negative    Nitrite, UA Negative Negative    Urobilinogen, UA 0.2 E.U./dL 0.2 - 1.0 E.U./dL   CBC Auto Differential    Collection Time: 11/08/24  5:52 AM    Specimen: Blood   Result Value Ref Range    WBC 7.84 3.40 - 10.80 10*3/mm3    RBC 4.43 3.77 - 5.28 10*6/mm3    Hemoglobin 13.5 12.0 - 15.9 g/dL    Hematocrit 38.0 34.0 - 46.6 %    MCV 85.8 79.0 - 97.0 fL    MCH 30.5 26.6 - 33.0 pg    MCHC 35.5 31.5 - 35.7 g/dL    RDW 12.5 12.3 - 15.4 %    RDW-SD 38.8 37.0 - 54.0 fl    MPV 11.6 6.0 - 12.0 fL    Platelets 154 140 - 450 10*3/mm3    Neutrophil % 69.9 42.7 - 76.0 %    Lymphocyte % 21.4 19.6 - 45.3 %    Monocyte % 7.8 5.0 - 12.0 %    Eosinophil % 0.4 0.3 - 6.2 %    Basophil % 0.1 0.0 - 1.5 %    Immature Grans % 0.4 0.0 - 0.5 %    Neutrophils, Absolute 5.48 1.70 - 7.00 10*3/mm3    Lymphocytes, Absolute 1.68 0.70 - 3.10 10*3/mm3    Monocytes, Absolute 0.61 0.10 - 0.90 10*3/mm3    Eosinophils, Absolute 0.03 0.00 - 0.40 10*3/mm3    Basophils, Absolute 0.01 0.00 - 0.20 10*3/mm3    Immature Grans, Absolute 0.03 0.00 - 0.05 10*3/mm3    nRBC 0.0 0.0 - 0.2 /100 WBC       Liu Paniagua MD  Obstetrics and Gynecology  [facility]

## 2024-11-08 NOTE — ANESTHESIA PROCEDURE NOTES
Spinal Block      Patient reassessed immediately prior to procedure    Patient location during procedure: OR  Stop Time: 11/8/2024 7:29 AM  Indication:post-op pain management and procedure for pain  Performed By  CRNA/CAA: Salbador Fu CRNA  Preanesthetic Checklist  Completed: patient identified, IV checked, site marked, risks and benefits discussed, surgical consent, monitors and equipment checked, pre-op evaluation and timeout performed  Spinal Block Prep:  Patient Position:sitting  Sterile Tech:gloves, mask and sterile barriers  Prep:Chloraprep  Patient Monitoring:blood pressure monitoring and continuous pulse oximetry    Spinal Block Procedure  Approach:midline  Guidance:palpation technique  Location:L4-L5  Needle Type:Sprotte  Needle Gauge:25 G  Placement of Spinal needle event:cerebrospinal fluid aspirated  Paresthesia: no  Fluid Appearance:clear     Post Assessment  Patient Tolerance:patient tolerated the procedure well with no apparent complications  Complications no

## 2024-11-09 VITALS
OXYGEN SATURATION: 98 % | DIASTOLIC BLOOD PRESSURE: 78 MMHG | BODY MASS INDEX: 29.41 KG/M2 | TEMPERATURE: 98.2 F | HEART RATE: 72 BPM | WEIGHT: 166 LBS | SYSTOLIC BLOOD PRESSURE: 116 MMHG | HEIGHT: 63 IN | RESPIRATION RATE: 18 BRPM

## 2024-11-09 LAB
BASOPHILS # BLD AUTO: 0.03 10*3/MM3 (ref 0–0.2)
BASOPHILS NFR BLD AUTO: 0.3 % (ref 0–1.5)
DEPRECATED RDW RBC AUTO: 41.4 FL (ref 37–54)
EOSINOPHIL # BLD AUTO: 0.08 10*3/MM3 (ref 0–0.4)
EOSINOPHIL NFR BLD AUTO: 0.9 % (ref 0.3–6.2)
ERYTHROCYTE [DISTWIDTH] IN BLOOD BY AUTOMATED COUNT: 12.8 % (ref 12.3–15.4)
HCT VFR BLD AUTO: 32.2 % (ref 34–46.6)
HGB BLD-MCNC: 10.8 G/DL (ref 12–15.9)
IMM GRANULOCYTES # BLD AUTO: 0.04 10*3/MM3 (ref 0–0.05)
IMM GRANULOCYTES NFR BLD AUTO: 0.5 % (ref 0–0.5)
LYMPHOCYTES # BLD AUTO: 1.54 10*3/MM3 (ref 0.7–3.1)
LYMPHOCYTES NFR BLD AUTO: 17.7 % (ref 19.6–45.3)
MCH RBC QN AUTO: 29.8 PG (ref 26.6–33)
MCHC RBC AUTO-ENTMCNC: 33.5 G/DL (ref 31.5–35.7)
MCV RBC AUTO: 88.7 FL (ref 79–97)
MONOCYTES # BLD AUTO: 0.64 10*3/MM3 (ref 0.1–0.9)
MONOCYTES NFR BLD AUTO: 7.4 % (ref 5–12)
NEUTROPHILS NFR BLD AUTO: 6.36 10*3/MM3 (ref 1.7–7)
NEUTROPHILS NFR BLD AUTO: 73.2 % (ref 42.7–76)
NRBC BLD AUTO-RTO: 0 /100 WBC (ref 0–0.2)
PLATELET # BLD AUTO: 158 10*3/MM3 (ref 140–450)
PMV BLD AUTO: 11.6 FL (ref 6–12)
RBC # BLD AUTO: 3.63 10*6/MM3 (ref 3.77–5.28)
WBC NRBC COR # BLD AUTO: 8.69 10*3/MM3 (ref 3.4–10.8)

## 2024-11-09 PROCEDURE — 85025 COMPLETE CBC W/AUTO DIFF WBC: CPT | Performed by: OBSTETRICS & GYNECOLOGY

## 2024-11-09 PROCEDURE — 0503F POSTPARTUM CARE VISIT: CPT | Performed by: OBSTETRICS & GYNECOLOGY

## 2024-11-09 RX ORDER — BUSPIRONE HYDROCHLORIDE 5 MG/1
10 TABLET ORAL 3 TIMES DAILY
Qty: 60 TABLET | Refills: 5 | Status: SHIPPED | OUTPATIENT
Start: 2024-11-09

## 2024-11-09 RX ORDER — DOCUSATE SODIUM 100 MG/1
100 CAPSULE, LIQUID FILLED ORAL 2 TIMES DAILY
Status: DISCONTINUED | OUTPATIENT
Start: 2024-11-09 | End: 2024-11-09 | Stop reason: HOSPADM

## 2024-11-09 RX ORDER — OXYCODONE HYDROCHLORIDE 5 MG/1
5 TABLET ORAL EVERY 6 HOURS PRN
Qty: 10 TABLET | Refills: 0 | Status: SHIPPED | OUTPATIENT
Start: 2024-11-09

## 2024-11-09 RX ORDER — FERROUS SULFATE 325(65) MG
325 TABLET ORAL
Qty: 60 TABLET | Refills: 6 | Status: SHIPPED | OUTPATIENT
Start: 2024-11-09

## 2024-11-09 RX ORDER — ACETAMINOPHEN 500 MG
1000 TABLET ORAL EVERY 8 HOURS PRN
Qty: 60 TABLET | Refills: 0 | Status: SHIPPED | OUTPATIENT
Start: 2024-11-09 | End: 2025-11-09

## 2024-11-09 RX ORDER — DOCUSATE SODIUM 100 MG/1
100 CAPSULE, LIQUID FILLED ORAL 2 TIMES DAILY
Qty: 60 CAPSULE | Refills: 1 | Status: SHIPPED | OUTPATIENT
Start: 2024-11-09

## 2024-11-09 RX ORDER — IBUPROFEN 800 MG/1
800 TABLET, FILM COATED ORAL EVERY 8 HOURS PRN
Qty: 30 TABLET | Refills: 0 | Status: SHIPPED | OUTPATIENT
Start: 2024-11-09

## 2024-11-09 RX ADMIN — SIMETHICONE 80 MG: 80 TABLET, CHEWABLE ORAL at 10:56

## 2024-11-09 RX ADMIN — ACETAMINOPHEN 1000 MG: 500 TABLET, FILM COATED ORAL at 08:17

## 2024-11-09 RX ADMIN — DOCUSATE SODIUM 100 MG: 100 CAPSULE, LIQUID FILLED ORAL at 10:56

## 2024-11-09 RX ADMIN — OXYCODONE HYDROCHLORIDE 5 MG: 5 TABLET ORAL at 11:02

## 2024-11-09 RX ADMIN — BUSPIRONE HYDROCHLORIDE 10 MG: 10 TABLET ORAL at 08:16

## 2024-11-09 RX ADMIN — IBUPROFEN 800 MG: 800 TABLET, FILM COATED ORAL at 12:09

## 2024-11-09 RX ADMIN — OXYCODONE HYDROCHLORIDE 10 MG: 5 TABLET ORAL at 17:46

## 2024-11-09 RX ADMIN — ACETAMINOPHEN 1000 MG: 500 TABLET, FILM COATED ORAL at 15:56

## 2024-11-09 RX ADMIN — ACETAMINOPHEN 1000 MG: 500 TABLET, FILM COATED ORAL at 00:28

## 2024-11-09 RX ADMIN — IBUPROFEN 800 MG: 800 TABLET, FILM COATED ORAL at 04:17

## 2024-11-09 NOTE — DISCHARGE INSTRUCTIONS
Section  Home Care Instructions:  Dr. Liu Paniagua     Thank you for choosing [facility] for the care of your pregnancy. Please let us know if you have questions or concerns or do not understand the information we give you. Always ask us to explain words or phrases you do not understand.     You have undergone a  delivery. Here are some basic guidelines to help you recover more quickly at home. Your doctor may give you more guidelines. If you have any questions after you go home, please call the numbers listed on the next page.     General Guidelines     1.   Do not lift anything over 10 pounds for about six weeks. This includes pushing or pulling heavy objects.  2.   Do not put anything in the vagina (no tampons or douching).    3.   Do not have sex until cleared by your physician.  4.   You may climb steps.  5.   You may bathe or shower.  6.   The only exercise you should do is walking. This is important for your recovery.  7.   Do not drive while taking narcotics.  8.   Take the medicines you were taking before surgery. Other medicines you need to take at home are:     Alternate Motrin and Tylenol around the clock. Take each every 8 hours in alternation so that you are getting one of the medications every 4 hours.   Roxicodone 5mg tab  - One tablet by mouth every 4-6 hours as needed for breakthrough pain   Metamucil + Colace daily to keep bowel movements soft          If you have questions about your medicines, let us know. Or, talk to your local pharmacist.     Surgery, lack of activity, pain medicines and iron pills tend to cause constipation. Take something every day to prevent constipation and straining.  Taking something like Metamucil® every day to increase fiber may prevent constipation. Follow the instructions on the container. If you need a gentle laxative, then something like Milk of Magnesia® or Correctol® work fairly well. You should not need to take laxatives  often.     10. Call your doctor if you have:      a.         Fever of 101F degrees or higher.  b.         Heavy vaginal bleeding.  c.         Increased redness around your incision (cut); incision pulling apart; drainage (pus), bleeding, or a large amount of fluid from your incision.  d.         Worsening nausea or pain.  e.         Other problems or concerns.     If you have any questions or concerns about your usual routines, please talk to the nurse or doctor.         To talk with your doctor at [facility], call:  Obstetrics and Gynecology Outpatient Clinic  Phone: [phone]        We appreciate the opportunity you have given us to participate in your care.

## 2024-11-09 NOTE — PLAN OF CARE
Goal Outcome Evaluation:         Patient serrano cath removed no void at this time. Pain controlled on current pain medication, fundus firm without massage. Mother pumping due to infant being in nursery. Bleeding wnl

## 2024-11-09 NOTE — PROGRESS NOTES
Daily Progress Note    Service: OB/GYN        Hospital Day: 2   Attending: Liu Paniagua MD     Diagnoses:         POD #1 s/p rLTCS at 38+3 weeks  Previous  section - not a  candidate  Transverse presentation, back down, head maternal right  Short interval pregnancy  Chronic hypertension, no medication  History of anxiety, on medication  Rh NEG    Subjective:                                                                                            No acute issues in the past 24 hours   Nausea/vomiting: no nausea and no vomiting  BM/Flatus: yes  Voiding: yes  Pain is well controlled with current regimen.  Ambulating: yes    ROS: She denies chest pain, shortness of breath, dizziness, lightheadedness, leg pain or leg swelling. All other systems reviewed and negative.    Objective:      24hour vital signs:   Temp:  [97.5 °F (36.4 °C)-98.1 °F (36.7 °C)] 98.1 °F (36.7 °C)  Heart Rate:  [69-91] 69  Resp:  [16-18] 16  BP: ()/(57-79) 101/68     I/O last 3 completed shifts:  In: 3340 [P.O.:240; I.V.:1000; IV Piggyback:2100]  Out: 2168 [Urine:1450; Blood:718]     General:    alert and no distress   HEENT:   NCAT, MMM   Cardiovascular:   RRR, no murmurs   Pulmonary:    CTAB, no wheezing, no crackles, unlabored breathing   Abdomen:  active bowel sounds, no TTP, no distension   Incision:  healing well, no significant drainage, no dehiscence, no significant erythema   Extremities:  no edema, wwp, SCDs in place b/l        Labs:  Results from last 7 days   Lab Units 24  0600 24  0552   WBC 10*3/mm3 8.69 7.84   HEMOGLOBIN g/dL 10.8* 13.5   HEMATOCRIT % 32.2* 38.0   PLATELETS 10*3/mm3 158 154                Other Notable Lab and Imaging Results:      Medications:  Scheduled Meds:  acetaminophen, 1,000 mg, Oral, Q8H  busPIRone, 10 mg, Oral, BID  ibuprofen, 800 mg, Oral, Q8H  prenatal vitamin, 1 tablet, Oral, Daily           Continuous Infusions:           PRN Meds:    Hydrocortisone (Perianal)     hydrOXYzine    influenza vaccine    ketorolac    Measles, Mumps & Rubella Vac    Morphine    nalbuphine **OR** nalbuphine    naloxone    ondansetron    ondansetron    ondansetron ODT    oxyCODONE **OR** oxyCODONE    oxytocin    promethazine **OR** promethazine    simethicone      .Assessment:     29 y.o. year old  female  POD #1 s/p rLTCS at 38+3 weeks  Previous  section - not a  candidate  Transverse presentation, back down, head maternal right  Short interval pregnancy  Chronic hypertension, no medication  History of anxiety, on medication  Rh NEG    Plan:     - Pain: well controlled with current regimen  - CV: No issues  - DVT ppx: SCD's, ambulate TID  - Pulm: IS bedside  - GI: Phenergan/Zofran prn nausea  - : No voiding issues. Adequate UOP.   - Heme: PP Hgb 10.8   - ID: Received appropriate preoperative antibiotics. No current issues  - FEN: SLIV. Regular diet, advance as tolerated. Replete lytes prn.  - Endocrine: No issues  - Wound: Routine postop care    Liu Paniagua MD  Obstetrics and Gynecology  [facility]

## 2024-11-09 NOTE — PLAN OF CARE
Goal Outcome Evaluation:  Plan of Care Reviewed With: patient           Outcome Evaluation: VSS, pain controlled with rx meds. Ambulating in [address]. Breast pumping and hand expressing encouraged.

## 2024-11-09 NOTE — DISCHARGE SUMMARY
OBSTETRICS DISCHARGE SUMMARY    Admission Date: 2024    Discharge Date:  2024     Discharge Diagnosis:   POD #1 s/p rLTCS at 38+3 weeks  Previous  section - not a  candidate  Transverse presentation, back down, head maternal right  Short interval pregnancy  Chronic hypertension, no medication  History of anxiety, on medication  Rh NEG    Procedures/Delivery information:  2024  Repeat  (LTCS)     Consults:  None    Pertinent Labs/Immaging:  Postpartum Hgb - 10.8    Hospital Summary  Sandhya Marte is a 29 y.o.  who was admitted on 2024 for scheduled  delivery.  She underwent delivery without complication.  Please see the operative note for additional details.    During the postpartum period, the patient met all postoperative goals including ambulating without difficulty, voiding without difficulty, passage of flatus and adequate intake and output. Her pain was well-controlled with PO medicines. The patient's lochia was appropriate. The patient was breast/bottle feeding.  Her infant continued to require supplemental oxygen therapy following delivery and was subsequently found by x-ray to have a pneumothorax.  The infant was transferred to  in the afternoon of . The patient was deemed stable for discharge that evening so that she could be with her child in [place] more easily.  Our plan will be to follow-up early next week in our office.     Discharge Medications:     Discharge Medications        New Medications        Instructions Start Date   acetaminophen 500 MG tablet  Commonly known as: TYLENOL   1,000 mg, Oral, Every 8 Hours PRN      docusate sodium 100 MG capsule  Commonly known as: Colace   100 mg, Oral, 2 Times Daily      ferrous sulfate 325 (65 FE) MG tablet   325 mg, Oral, 2 Times Daily Before Meals      ibuprofen 800 MG tablet  Commonly known as: ADVIL,MOTRIN   800 mg, Oral, Every 8 Hours PRN      oxyCODONE 5 MG immediate release  tablet  Commonly known as: Roxicodone   5 mg, Oral, Every 6 Hours PRN             Changes to Medications        Instructions Start Date   busPIRone 5 MG tablet  Commonly known as: BUSPAR  What changed: when to take this   10 mg, Oral, 3 Times Daily             Continue These Medications        Instructions Start Date   prenatal (CLASSIC) vitamin 28-0.8 MG tablet tablet  Generic drug: prenatal vitamin   Daily             Stop These Medications      ASPIRIN ADULT PO              Physical Exam on D/C  Gen: NAD  CV: RRR  Pulm: resps unlabored  Abd: soft, minimally tender, non-distended, fundus firm and non-tender below umbilicus  Incision: Well-healing, Insorb staples, no erythema, no drainage  Ext: wwp, non-tender, SCDs in place    Follow up:  1 week in our office    Time spent on discharge: 15 minutes    Liu Paniagua MD  Obstetrics and Gynecology  [facility]

## 2024-11-12 ENCOUNTER — OFFICE VISIT (OUTPATIENT)
Dept: OBSTETRICS AND GYNECOLOGY | Facility: CLINIC | Age: 29
End: 2024-11-12
Payer: COMMERCIAL

## 2024-11-12 VITALS
SYSTOLIC BLOOD PRESSURE: 122 MMHG | DIASTOLIC BLOOD PRESSURE: 80 MMHG | BODY MASS INDEX: 27.29 KG/M2 | HEIGHT: 63 IN | WEIGHT: 154 LBS

## 2024-11-12 DIAGNOSIS — Z98.891 S/P CESAREAN SECTION: Primary | ICD-10-CM

## 2024-11-12 LAB
BH BB BLOOD EXPIRATION DATE: NORMAL
BH BB BLOOD TYPE BARCODE: 600
BH BB DISPENSE STATUS: NORMAL
BH BB PRODUCT CODE: NORMAL
BH BB UNIT NUMBER: NORMAL
CROSSMATCH INTERPRETATION: NORMAL
UNIT  ABO: NORMAL
UNIT  RH: NORMAL

## 2024-11-12 PROCEDURE — 99024 POSTOP FOLLOW-UP VISIT: CPT | Performed by: OBSTETRICS & GYNECOLOGY

## 2024-11-17 ENCOUNTER — HOSPITAL ENCOUNTER (EMERGENCY)
Facility: HOSPITAL | Age: 29
Discharge: HOME | End: 2024-11-17
Attending: STUDENT IN AN ORGANIZED HEALTH CARE EDUCATION/TRAINING PROGRAM | Admitting: STUDENT IN AN ORGANIZED HEALTH CARE EDUCATION/TRAINING PROGRAM
Payer: COMMERCIAL

## 2024-11-17 ENCOUNTER — APPOINTMENT (OUTPATIENT)
Dept: CT IMAGING | Facility: HOSPITAL | Age: 29
End: 2024-11-17
Payer: COMMERCIAL

## 2024-11-17 ENCOUNTER — APPOINTMENT (OUTPATIENT)
Dept: GENERAL RADIOLOGY | Facility: HOSPITAL | Age: 29
End: 2024-11-17
Payer: COMMERCIAL

## 2024-11-17 VITALS
TEMPERATURE: 97.8 F | OXYGEN SATURATION: 97 % | DIASTOLIC BLOOD PRESSURE: 75 MMHG | SYSTOLIC BLOOD PRESSURE: 114 MMHG | RESPIRATION RATE: 18 BRPM | BODY MASS INDEX: 25.69 KG/M2 | HEART RATE: 102 BPM | HEIGHT: 63 IN | WEIGHT: 145 LBS

## 2024-11-17 DIAGNOSIS — R06.00 DYSPNEA, UNSPECIFIED TYPE: ICD-10-CM

## 2024-11-17 DIAGNOSIS — R07.9 CHEST PAIN, UNSPECIFIED TYPE: Primary | ICD-10-CM

## 2024-11-17 LAB
ALBUMIN SERPL-MCNC: 3.8 G/DL (ref 3.5–5.2)
ALBUMIN/GLOB SERPL: 1.3 G/DL
ALP SERPL-CCNC: 114 U/L (ref 39–117)
ALT SERPL W P-5'-P-CCNC: 26 U/L (ref 1–33)
ANION GAP SERPL CALCULATED.3IONS-SCNC: 11.7 MMOL/L (ref 5–15)
AST SERPL-CCNC: 26 U/L (ref 1–32)
BASOPHILS # BLD AUTO: 0.01 10*3/MM3 (ref 0–0.2)
BASOPHILS NFR BLD AUTO: 0.1 % (ref 0–1.5)
BILIRUB SERPL-MCNC: 0.4 MG/DL (ref 0–1.2)
BUN SERPL-MCNC: 14 MG/DL (ref 6–20)
BUN/CREAT SERPL: 14.3 (ref 7–25)
CALCIUM SPEC-SCNC: 8.8 MG/DL (ref 8.6–10.5)
CHLORIDE SERPL-SCNC: 104 MMOL/L (ref 98–107)
CO2 SERPL-SCNC: 23.3 MMOL/L (ref 22–29)
CREAT SERPL-MCNC: 0.98 MG/DL (ref 0.57–1)
D DIMER PPP FEU-MCNC: 1.18 MCGFEU/ML (ref 0–0.5)
DEPRECATED RDW RBC AUTO: 40.7 FL (ref 37–54)
EGFRCR SERPLBLD CKD-EPI 2021: 81.3 ML/MIN/1.73
EOSINOPHIL # BLD AUTO: 0.07 10*3/MM3 (ref 0–0.4)
EOSINOPHIL NFR BLD AUTO: 0.9 % (ref 0.3–6.2)
ERYTHROCYTE [DISTWIDTH] IN BLOOD BY AUTOMATED COUNT: 12.5 % (ref 12.3–15.4)
GLOBULIN UR ELPH-MCNC: 3 GM/DL
GLUCOSE SERPL-MCNC: 94 MG/DL (ref 65–99)
HCT VFR BLD AUTO: 41.5 % (ref 34–46.6)
HGB BLD-MCNC: 14 G/DL (ref 12–15.9)
HOLD SPECIMEN: NORMAL
HOLD SPECIMEN: NORMAL
IMM GRANULOCYTES # BLD AUTO: 0.02 10*3/MM3 (ref 0–0.05)
IMM GRANULOCYTES NFR BLD AUTO: 0.3 % (ref 0–0.5)
LYMPHOCYTES # BLD AUTO: 1.65 10*3/MM3 (ref 0.7–3.1)
LYMPHOCYTES NFR BLD AUTO: 22.1 % (ref 19.6–45.3)
MCH RBC QN AUTO: 29.7 PG (ref 26.6–33)
MCHC RBC AUTO-ENTMCNC: 33.7 G/DL (ref 31.5–35.7)
MCV RBC AUTO: 87.9 FL (ref 79–97)
MONOCYTES # BLD AUTO: 0.56 10*3/MM3 (ref 0.1–0.9)
MONOCYTES NFR BLD AUTO: 7.5 % (ref 5–12)
NEUTROPHILS NFR BLD AUTO: 5.14 10*3/MM3 (ref 1.7–7)
NEUTROPHILS NFR BLD AUTO: 69.1 % (ref 42.7–76)
NRBC BLD AUTO-RTO: 0 /100 WBC (ref 0–0.2)
PLATELET # BLD AUTO: 367 10*3/MM3 (ref 140–450)
PMV BLD AUTO: 9.1 FL (ref 6–12)
POTASSIUM SERPL-SCNC: 4.5 MMOL/L (ref 3.5–5.2)
PROT SERPL-MCNC: 6.8 G/DL (ref 6–8.5)
RBC # BLD AUTO: 4.72 10*6/MM3 (ref 3.77–5.28)
SODIUM SERPL-SCNC: 139 MMOL/L (ref 136–145)
TROPONIN T SERPL HS-MCNC: <6 NG/L
WBC NRBC COR # BLD AUTO: 7.45 10*3/MM3 (ref 3.4–10.8)
WHOLE BLOOD HOLD COAG: NORMAL
WHOLE BLOOD HOLD SPECIMEN: NORMAL

## 2024-11-17 PROCEDURE — 83880 ASSAY OF NATRIURETIC PEPTIDE: CPT | Performed by: OBSTETRICS & GYNECOLOGY

## 2024-11-17 PROCEDURE — 80053 COMPREHEN METABOLIC PANEL: CPT

## 2024-11-17 PROCEDURE — 85379 FIBRIN DEGRADATION QUANT: CPT | Performed by: PHYSICIAN ASSISTANT

## 2024-11-17 PROCEDURE — 36415 COLL VENOUS BLD VENIPUNCTURE: CPT | Performed by: STUDENT IN AN ORGANIZED HEALTH CARE EDUCATION/TRAINING PROGRAM

## 2024-11-17 PROCEDURE — 93005 ELECTROCARDIOGRAM TRACING: CPT | Performed by: STUDENT IN AN ORGANIZED HEALTH CARE EDUCATION/TRAINING PROGRAM

## 2024-11-17 PROCEDURE — 93005 ELECTROCARDIOGRAM TRACING: CPT

## 2024-11-17 PROCEDURE — 84484 ASSAY OF TROPONIN QUANT: CPT

## 2024-11-17 PROCEDURE — 99285 EMERGENCY DEPT VISIT HI MDM: CPT | Performed by: STUDENT IN AN ORGANIZED HEALTH CARE EDUCATION/TRAINING PROGRAM

## 2024-11-17 PROCEDURE — 25510000001 IOPAMIDOL 61 % SOLUTION: Performed by: STUDENT IN AN ORGANIZED HEALTH CARE EDUCATION/TRAINING PROGRAM

## 2024-11-17 PROCEDURE — 71275 CT ANGIOGRAPHY CHEST: CPT

## 2024-11-17 PROCEDURE — 85025 COMPLETE CBC W/AUTO DIFF WBC: CPT

## 2024-11-17 RX ORDER — IOPAMIDOL 612 MG/ML
100 INJECTION, SOLUTION INTRAVASCULAR
Status: COMPLETED | OUTPATIENT
Start: 2024-11-17 | End: 2024-11-17

## 2024-11-17 RX ORDER — SODIUM CHLORIDE 0.9 % (FLUSH) 0.9 %
10 SYRINGE (ML) INJECTION AS NEEDED
Status: DISCONTINUED | OUTPATIENT
Start: 2024-11-17 | End: 2024-11-17 | Stop reason: HOSPADM

## 2024-11-17 RX ADMIN — IOPAMIDOL 80 ML: 612 INJECTION, SOLUTION INTRAVENOUS at 16:57

## 2024-11-17 NOTE — ED PROVIDER NOTES
Subjective:  Chief Complaint: Chest pain, SOA    History of Present Illness:  Patient is a pleasant 29-year-old female who is 8 days postpartum after a  presenting to the ER with complaints of chest tightness and shortness of breath over the last 2 days.  Patient denies any abdominal pain.  States her surgical scar appears well.  Denies nausea, vomiting, fevers.  Patient states that another provider had discussed ordering a CT scan of her chest.  Patient states that her baby is in the NICU.  Appears that baby had a pneumothorax and was transferred to  per OB note from here.  Patient denies additional symptoms or complaints at this time.      Nurses Notes reviewed and agree, including vitals, allergies, social history and prior medical history.     REVIEW OF SYSTEMS: All systems reviewed and not pertinent unless noted.  Review of Systems   Respiratory:  Positive for chest tightness and shortness of breath.    All other systems reviewed and are negative.      Past Medical History:   Diagnosis Date    Abnormal Pap smear of cervix 2021    Anxiety 2023    Gestational hypertension 2023    HPV (human papilloma virus) infection 2021    Hypertension 2023    Preeclampsia 2023       Allergies:    Patient has no known allergies.      Past Surgical History:   Procedure Laterality Date     SECTION       SECTION N/A 2024    Procedure:  SECTION REPEAT;  Surgeon: Liu Paniagua MD;  Location: [facility];  Service: Obstetrics/Gynecology;  Laterality: N/A;    KNEE ARTHROSCOPY W/ ACL RECONSTRUCTION Right     WISDOM TOOTH EXTRACTION  2015         Social History     Socioeconomic History    Marital status:      Spouse name: Jacob   Tobacco Use    Smoking status: Never    Smokeless tobacco: Never   Vaping Use    Vaping status: Never Used   Substance and Sexual Activity    Alcohol use: Not Currently     Comment: occ.     Drug use: Never    Sexual activity:  "Yes     Partners: Male     Birth control/protection: Condom         Family History   Problem Relation Age of Onset    Hypertension Mother         she had this when she was pregnant with me    Melanoma Maternal Grandmother        Objective  Physical Exam:  /75   Pulse 102   Temp 97.8 °F (36.6 °C)   Resp 18   Ht 160 cm (63\")   Wt 65.8 kg (145 lb)   SpO2 97%   Breastfeeding No   BMI 25.69 kg/m²      Physical Exam  Vitals and nursing note reviewed.   Constitutional:       General: She is not in acute distress.     Appearance: She is not toxic-appearing.   HENT:      Head: Normocephalic and atraumatic.   Eyes:      Extraocular Movements: Extraocular movements intact.   Cardiovascular:      Rate and Rhythm: Tachycardia present.      Heart sounds: Normal heart sounds.   Pulmonary:      Breath sounds: Normal breath sounds.   Abdominal:      Palpations: Abdomen is soft.      Tenderness: There is no abdominal tenderness.   Musculoskeletal:         General: Normal range of motion.      Cervical back: Normal range of motion and neck supple.   Skin:     General: Skin is warm and dry.   Neurological:      General: No focal deficit present.      Mental Status: She is alert and oriented to person, place, and time.   Psychiatric:         Mood and Affect: Mood normal.         Behavior: Behavior normal.         Procedures    ED Course:    ED Course as of 11/17/24 1754   Sun Nov 17, 2024   1508 EKG per my interpretation normal sinus rhythm, rate 86, rightward axis, no ST segment elevation or depression, nonspecific T waves, normal QRS QTC intervals.   [JS]   1750 I have reviewed the mid-level provider(s) note and verbally discussed the case/plan of care.  I was available for consultation as needed at all times during the patient's visit in the Emergency Department.  I agree with the clinical impression, plan, and disposition unless otherwise stated in the MDM below.    ATTENDING ATTESTATION  HPI: 29-year-old female 8 days " postpartum who presents with chest pain and shortness of breath over the past 1 to 2 days.    MDM: ED workup reviewed.    EKG per my interpretation normal sinus rhythm, rate 86, rightward axis, no ST segment elevation or depression, nonspecific T waves, normal QRS QTC intervals.    I have reviewed the labs results.  Notable findings include D-dimer elevated.  CBC, CMP, troponin clinically unremarkable.    Radiology reports reviewed.     CT Angiogram Chest Pulmonary Embolism    Result Date: 11/17/2024  Impression: Normal CTA of the chest. Authenticated and Electronically Signed by Aime Olvera MD on 11/17/2024 05:41:32 PM     [JS]      ED Course User Index  [JS] Zeeshan Garces DO       Lab Results (last 24 hours)       Procedure Component Value Units Date/Time    CBC & Differential [[id number]]  (Normal) Collected: 11/17/24 1459    Specimen: Blood Updated: 11/17/24 1506    Narrative:      The following orders were created for panel order CBC & Differential.  Procedure                               Abnormality         Status                     ---------                               -----------         ------                     CBC Auto Differential[[id number]]        Normal              Final result                 Please view results for these tests on the individual orders.    Comprehensive Metabolic Panel [[id number]] Collected: 11/17/24 1459    Specimen: Blood Updated: 11/17/24 1523     Glucose 94 mg/dL      BUN 14 mg/dL      Creatinine 0.98 mg/dL      Sodium 139 mmol/L      Potassium 4.5 mmol/L      Chloride 104 mmol/L      CO2 23.3 mmol/L      Calcium 8.8 mg/dL      Total Protein 6.8 g/dL      Albumin 3.8 g/dL      ALT (SGPT) 26 U/L      AST (SGOT) 26 U/L      Alkaline Phosphatase 114 U/L      Total Bilirubin 0.4 mg/dL      Globulin 3.0 gm/dL      A/G Ratio 1.3 g/dL      BUN/Creatinine Ratio 14.3     Anion Gap 11.7 mmol/L      eGFR 81.3 mL/min/1.73     Narrative:      GFR Normal >60  Chronic Kidney Disease  <60  Kidney Failure <15      High Sensitivity Troponin T [[id number]]  (Normal) Collected: 11/17/24 1459    Specimen: Blood Updated: 11/17/24 1526     HS Troponin T <6 ng/L     Narrative:      High Sensitive Troponin T Reference Range:  <14.0 ng/L- Negative Female for AMI  <22.0 ng/L- Negative Male for AMI  >=14 - Abnormal Female indicating possible myocardial injury.  >=22 - Abnormal Male indicating possible myocardial injury.   Clinicians would have to utilize clinical acumen, EKG, Troponin, and serial changes to determine if it is an Acute Myocardial Infarction or myocardial injury due to an underlying chronic condition.         CBC Auto Differential [[id number]]  (Normal) Collected: 11/17/24 1459    Specimen: Blood Updated: 11/17/24 1506     WBC 7.45 10*3/mm3      RBC 4.72 10*6/mm3      Hemoglobin 14.0 g/dL      Hematocrit 41.5 %      MCV 87.9 fL      MCH 29.7 pg      MCHC 33.7 g/dL      RDW 12.5 %      RDW-SD 40.7 fl      MPV 9.1 fL      Platelets 367 10*3/mm3      Neutrophil % 69.1 %      Lymphocyte % 22.1 %      Monocyte % 7.5 %      Eosinophil % 0.9 %      Basophil % 0.1 %      Immature Grans % 0.3 %      Neutrophils, Absolute 5.14 10*3/mm3      Lymphocytes, Absolute 1.65 10*3/mm3      Monocytes, Absolute 0.56 10*3/mm3      Eosinophils, Absolute 0.07 10*3/mm3      Basophils, Absolute 0.01 10*3/mm3      Immature Grans, Absolute 0.02 10*3/mm3      nRBC 0.0 /100 WBC     D-dimer, Quantitative [[id number]]  (Abnormal) Collected: 11/17/24 1459    Specimen: Blood Updated: 11/17/24 1552     D-Dimer, Quantitative 1.18 MCGFEU/mL     Narrative:      According to the assay 's published package insert, a normal (<0.50 MCGFEU/mL) D-dimer result in conjunction with a non-high clinical probability assessment, excludes deep vein thrombosis (DVT) and pulmonary embolism (PE) with high sensitivity.    D-dimer values increase with age and this can make VTE exclusion of an older population difficult. To address this, the  "American College [employer], based on best available evidence and recent guidelines, recommends that clinicians use age-adjusted D-dimer thresholds in patients greater than 50 years of age with: a) a low probability of PE who do not meet all Pulmonary Embolism Rule Out Criteria, or b) in those with intermediate probability of PE.   The formula for an age-adjusted D-dimer cut-off is \"age/100\".  For example, a 60 year old patient would have an age-adjusted cut-off of 0.60 MCGFEU/mL and an 80 year old 0.80 MCGFEU/mL.             CT Angiogram Chest Pulmonary Embolism    Result Date: 11/17/2024  FINAL REPORT TECHNIQUE: null CLINICAL HISTORY: recent c section, chest pain, SOA, elevated D Dimer COMPARISON: null FINDINGS: CTA of the chest after administration of IV contrast. Technique: Axial CT images from the lung apices through the adrenal glands after administration of IV contrast. 3D postprocessing and/or MIPS were included. Comparison: None Findings: No pulmonary embolus. Normal thoracic aorta. No aneurysm or dissection. No mediastinal or axillary adenopathy. Normal lungs. No pulmonary nodules. No areas of consolidation. No pneumothorax. Normal bones. Normal limited upper abdomen.     Impression: Impression: Normal CTA of the chest. Authenticated and Electronically Signed by Aime Olvera MD on 11/17/2024 05:41:32 PM        MDM  Patient evaluated in the ER for chest tightness and shortness of breath.  Patient is hemodynamically stable, afebrile, nontoxic-appearing on exam.  Oxygen saturation is 97% on room air.  Differential diagnosis includes but is not limited to PE, cardiac arrhythmia, ACS, viral illness, among others.  Initial plan includes CBC, CMP, troponin, D-dimer, EKG, chest x-ray was placed by nursing triage protocol.  CXR was canceled as patient wanted to wait until D-dimer was back as we discussed possibility of PE especially given recent surgery.    D-dimer was elevated and CTA PE protocol ordered.  " CTA was normal per radiology.  Patient agreeable with plan for discharge and anxious to get back to her baby who is currently in the NICU.  Given that symptoms have been ongoing for longer than 3 hours, do not feel that second troponin is necessary.  Patient discharged in stable condition.  Recommended follow-up with PCP and OB/GYN for further outpatient evaluation if symptoms persist.  Precautions were given for return to the ER for any new or worsening symptoms.    Final diagnoses:   Chest pain, unspecified type   Dyspnea, unspecified type   Postpartum state          Uzma Grace, PA-C  11/17/24 2639

## 2024-11-17 NOTE — DISCHARGE INSTRUCTIONS
Follow up with your PCP and your OB/GYN for further outpatient evaluation if symptoms persist.  Return to the ER for new or worsening symptoms or acute concerns.

## 2024-11-18 ENCOUNTER — MATERNAL SCREENING (OUTPATIENT)
Dept: CALL CENTER | Facility: HOSPITAL | Age: 29
End: 2024-11-18
Payer: COMMERCIAL

## 2024-11-18 DIAGNOSIS — R07.9 CHEST PAIN, UNSPECIFIED TYPE: ICD-10-CM

## 2024-11-18 DIAGNOSIS — R06.00 DYSPNEA, UNSPECIFIED TYPE: Primary | ICD-10-CM

## 2024-11-18 LAB — NT-PROBNP SERPL-MCNC: <36 PG/ML (ref 0–450)

## 2024-11-18 NOTE — OUTREACH NOTE
Maternal Screening Survey      Flowsheet Row Responses   Facility patient discharged from? [facility]   Attempt successful? No   Unsuccessful attempts Attempt 2              Maryjane QUEEN - Registered Nurse

## 2024-11-18 NOTE — OUTREACH NOTE
Maternal Screening Survey      Flowsheet Row Responses   Facility patient discharged from? [facility]   Attempt successful? No   Unsuccessful attempts Attempt 1              Steven WASHINGTON - Registered Nurse

## 2024-11-19 ENCOUNTER — MATERNAL SCREENING (OUTPATIENT)
Dept: CALL CENTER | Facility: HOSPITAL | Age: 29
End: 2024-11-19
Payer: COMMERCIAL

## 2024-11-19 NOTE — OUTREACH NOTE
Maternal Screening Survey      Flowsheet Row Responses   Facility patient discharged from? [facility]   Attempt successful? No   Unsuccessful attempts Attempt 3   Revoke Decline to participate  [No answer x 3.]              Steven WASHINGTON - Registered Nurse

## 2024-11-25 NOTE — PROGRESS NOTES
"Postoperative Assessment Visit    Subjective   Chief Complaint   Patient presents with    Post-op Follow-up     No Complaints/concerns        29 y.o.  female who presents for a post-op visit.    Pre-Operative Dx:   IUP at 38w3d weeks   Previous  section - not a  candidate  Transverse presentation, back down, head maternal right  Short interval pregnancy  Chronic hypertension, no medication  History of anxiety, on medication  Rh NEG       Postoperative dx:    Same      Procedure: Repeat  (LTCS)     The patient is doing well with no issues.  The baby is still in the NICU at  with bilateral pneumothorax, but doing better overall.     Objective   Vitals:    24 1557   BP: 122/80   Weight: 69.9 kg (154 lb)   Height: 160 cm (63\")     Physical Exam:  Incision(s): Well-healing, no signs of infection or separation  Reassuring postoperative exam       Medical Decision Making:    I have reviewed the operative note.  I have reviewed the postoperative labs where appropriate.    Assessment   rLTCS  Post-op evaluation  Chronic hypertension, stable  Anxiety/Depression, stable     Plan    No orders of the defined types were placed in this encounter.      Medication Management: none    Patient is meeting all post-op milestones..  Surgical findings discussed.  Postoperative precautions and restrictions reviewed.  Birth control options discussed.    RTC for full postoperative exam in 5 weeks.    Liu Paniagua MD  Obstetrics and Gynecology  [facility]   "

## 2024-11-27 DIAGNOSIS — N61.0 ACUTE MASTITIS: Primary | ICD-10-CM

## 2024-11-27 RX ORDER — DICLOXACILLIN SODIUM 500 MG/1
500 CAPSULE ORAL 4 TIMES DAILY
Qty: 20 CAPSULE | Refills: 0 | Status: SHIPPED | OUTPATIENT
Start: 2024-11-27 | End: 2024-12-02

## 2024-12-16 ENCOUNTER — POSTPARTUM VISIT (OUTPATIENT)
Dept: OBSTETRICS AND GYNECOLOGY | Facility: CLINIC | Age: 29
End: 2024-12-16
Payer: COMMERCIAL

## 2024-12-16 VITALS
DIASTOLIC BLOOD PRESSURE: 70 MMHG | BODY MASS INDEX: 25.52 KG/M2 | SYSTOLIC BLOOD PRESSURE: 114 MMHG | HEIGHT: 63 IN | WEIGHT: 144 LBS

## 2024-12-16 DIAGNOSIS — Z98.891 S/P CESAREAN SECTION: Primary | ICD-10-CM

## 2024-12-16 RX ORDER — ACETAMINOPHEN AND CODEINE PHOSPHATE 120; 12 MG/5ML; MG/5ML
1 SOLUTION ORAL DAILY
Qty: 90 TABLET | Refills: 5 | Status: SHIPPED | OUTPATIENT
Start: 2024-12-16 | End: 2025-12-16

## 2024-12-17 NOTE — PROGRESS NOTES
"Postoperative Assessment Visit    Subjective   Chief Complaint   Patient presents with    Postpartum Care     6 week postpartum w/ no complaints/concerns . Wants pills for birthcontrol       29 y.o.  female who presents for a post-op visit.    Pre-Operative Dx:   IUP at 38w3d weeks   Previous  section - not a  candidate  Transverse presentation, back down, head maternal right  Short interval pregnancy  Chronic hypertension, no medication  History of anxiety, on medication  Rh NEG       Postoperative dx:    Same      Procedure: Repeat  (LTCS)     The patient is doing well with no issues.  She is meeting all postoperative and postpartum milestones.  The baby has done well since coming home from the NICU.     Objective   Vitals:    24 1411   BP: 114/70   Weight: 65.3 kg (144 lb)   Height: 160 cm (63\")     Physical Exam:  Incision(s): Well-healing, no signs of infection or separation  Reassuring postoperative exam       Medical Decision Making:    I have reviewed the operative note.  I have reviewed the postoperative labs where appropriate.    Assessment   rLTCS  Post-op evaluation  Chronic hypertension, stable  Anxiety/Depression, stable  Baby with bilateral pneumothoraces, now home from NICU     Plan    No orders of the defined types were placed in this encounter.      Medication Management: Micronor    Patient is meeting all post-op + PP milestones.  Breast-feeding without issue.  Birth control options discussed.  Micronor for now.  LARCs, tubal and vasectomy discussed.    RTC for annual visit + Pap smear in 6 months.    Liu Paniagua MD  Obstetrics and Gynecology     "

## 2024-12-18 DIAGNOSIS — O99.340 ANXIETY DURING PREGNANCY: ICD-10-CM

## 2024-12-18 DIAGNOSIS — F41.9 ANXIETY DURING PREGNANCY: ICD-10-CM

## 2024-12-18 RX ORDER — BUSPIRONE HYDROCHLORIDE 5 MG/1
10 TABLET ORAL 3 TIMES DAILY
Qty: 60 TABLET | Refills: 5 | Status: SHIPPED | OUTPATIENT
Start: 2024-12-18

## 2025-04-25 DIAGNOSIS — F41.9 ANXIETY DURING PREGNANCY: ICD-10-CM

## 2025-04-25 DIAGNOSIS — O99.340 ANXIETY DURING PREGNANCY: ICD-10-CM

## 2025-04-25 RX ORDER — BUSPIRONE HYDROCHLORIDE 5 MG/1
10 TABLET ORAL DAILY PRN
Qty: 60 TABLET | Refills: 5 | Status: SHIPPED | OUTPATIENT
Start: 2025-04-25

## 2025-05-22 RX ORDER — DICLOXACILLIN SODIUM 500 MG/1
500 CAPSULE ORAL 4 TIMES DAILY
Qty: 28 CAPSULE | Refills: 0 | Status: SHIPPED | OUTPATIENT
Start: 2025-05-22

## 2025-05-22 RX ORDER — IBUPROFEN 800 MG/1
800 TABLET, FILM COATED ORAL EVERY 6 HOURS PRN
Qty: 30 TABLET | Refills: 0 | Status: SHIPPED | OUTPATIENT
Start: 2025-05-22

## 2025-08-27 DIAGNOSIS — F41.9 ANXIETY DURING PREGNANCY: ICD-10-CM

## 2025-08-27 DIAGNOSIS — O99.340 ANXIETY DURING PREGNANCY: ICD-10-CM

## 2025-08-27 RX ORDER — BUSPIRONE HYDROCHLORIDE 5 MG/1
10 TABLET ORAL 2 TIMES DAILY
Qty: 60 TABLET | Refills: 5 | Status: SHIPPED | OUTPATIENT
Start: 2025-08-27

## (undated) DEVICE — DRSNG WND BORDR/ADHS NONADHR/GZ LF 4X10IN STRL

## (undated) DEVICE — STERILE BABY BLANKET W/CSR: Brand: MEDLINE

## (undated) DEVICE — GLV SURG SENSICARE PI LF PF 7.5 GRN STRL

## (undated) DEVICE — LARGE, DISPOSABLE ALEXIS O C-SECTION PROTECTOR - RETRACTOR: Brand: ALEXIS ® O C-SECTION PROTECTOR - RETRACTOR

## (undated) DEVICE — ADHS LIQ MASTISOL 2/3ML

## (undated) DEVICE — STRIP,CLOSURE,WOUND,MEDI-STRIP,1/2X4: Brand: MEDLINE

## (undated) DEVICE — GLV SURG BIOGEL PI ULTRATOUCH G SZ7.5 LF

## (undated) DEVICE — TOWEL,OR,DSP,ST,NATURAL,DLX,4/PK,20PK/CS: Brand: MEDLINE

## (undated) DEVICE — PATIENT RETURN ELECTRODE, SINGLE-USE, CONTACT QUALITY MONITORING, ADULT, WITH 9FT CORD, FOR PATIENTS WEIGING OVER 33LBS. (15KG): Brand: MEGADYNE

## (undated) DEVICE — SUT MNCRYL 0 CT 36IN

## (undated) DEVICE — SOL IRR NACL 0.9PCT BO 1000ML

## (undated) DEVICE — STPLR SKIN SUBCUTICULAR INSORB 2030

## (undated) DEVICE — SLV SCD CALF HEMOFORCE DVT THERP REPROC MD

## (undated) DEVICE — SUT MNCRYL PLS ANTIB UD 3/0 PS2 27IN

## (undated) DEVICE — SUT PDS 0 CT 36IN DYED Z358T

## (undated) DEVICE — PENCL SMOKE/EVAC MEGADYNE TELESCP 10FT

## (undated) DEVICE — RICH C-SECTION: Brand: MEDLINE INDUSTRIES, INC.

## (undated) DEVICE — SUTURE GUT CHROMIC 3/0 912H

## (undated) DEVICE — GOWN SURG ORBIS LVL3 LG STRL